# Patient Record
Sex: FEMALE | Race: WHITE | NOT HISPANIC OR LATINO | Employment: UNEMPLOYED | URBAN - METROPOLITAN AREA
[De-identification: names, ages, dates, MRNs, and addresses within clinical notes are randomized per-mention and may not be internally consistent; named-entity substitution may affect disease eponyms.]

---

## 2024-09-03 ENCOUNTER — OFFICE VISIT (OUTPATIENT)
Dept: ENDOCRINOLOGY | Age: 44
End: 2024-09-03
Payer: OTHER GOVERNMENT

## 2024-09-03 VITALS
OXYGEN SATURATION: 98 % | TEMPERATURE: 94.5 F | HEART RATE: 68 BPM | WEIGHT: 137 LBS | SYSTOLIC BLOOD PRESSURE: 108 MMHG | BODY MASS INDEX: 19.18 KG/M2 | DIASTOLIC BLOOD PRESSURE: 72 MMHG | HEIGHT: 71 IN

## 2024-09-03 DIAGNOSIS — E04.2 MULTIPLE THYROID NODULES: Primary | ICD-10-CM

## 2024-09-03 DIAGNOSIS — E03.9 ACQUIRED HYPOTHYROIDISM: ICD-10-CM

## 2024-09-03 DIAGNOSIS — K21.9 GASTROESOPHAGEAL REFLUX DISEASE, UNSPECIFIED WHETHER ESOPHAGITIS PRESENT: ICD-10-CM

## 2024-09-03 RX ORDER — LEVOTHYROXINE SODIUM 25 UG/1
25 TABLET ORAL
Qty: 90 TABLET | Refills: 1 | Status: SHIPPED | OUTPATIENT
Start: 2024-09-03

## 2024-09-03 RX ORDER — LEVOTHYROXINE SODIUM 25 UG/1
25 TABLET ORAL
COMMUNITY
Start: 2024-07-15 | End: 2024-09-03 | Stop reason: SDUPTHER

## 2024-09-03 RX ORDER — AZELASTINE 1 MG/ML
SPRAY, METERED NASAL
COMMUNITY
Start: 2024-05-09

## 2024-09-03 RX ORDER — SUMATRIPTAN 50 MG/1
TABLET, FILM COATED ORAL
COMMUNITY
Start: 2024-06-03

## 2024-09-03 RX ORDER — FLUTICASONE PROPIONATE 50 MCG
SPRAY, SUSPENSION (ML) NASAL
COMMUNITY
Start: 2024-06-03

## 2024-09-03 RX ORDER — ONDANSETRON 8 MG/1
8 TABLET, FILM COATED ORAL EVERY 8 HOURS PRN
COMMUNITY

## 2024-09-03 RX ORDER — SODIUM CHLORIDE 0.65 %
AEROSOL, SPRAY (ML) NASAL
COMMUNITY
Start: 2024-06-03
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
## 2024-09-04 LAB
ALBUMIN SERPL-MCNC: 4.6 G/DL (ref 3.5–5.2)
ALBUMIN/GLOB SERPL: 2.2 G/DL
ALP SERPL-CCNC: 38 U/L (ref 39–117)
ALT SERPL-CCNC: 11 U/L (ref 1–33)
AST SERPL-CCNC: 13 U/L (ref 1–32)
BILIRUB SERPL-MCNC: 0.6 MG/DL (ref 0–1.2)
BUN SERPL-MCNC: 7 MG/DL (ref 6–20)
BUN/CREAT SERPL: 10 (ref 7–25)
CALCIUM SERPL-MCNC: 9.2 MG/DL (ref 8.6–10.5)
CHLORIDE SERPL-SCNC: 106 MMOL/L (ref 98–107)
CO2 SERPL-SCNC: 25.9 MMOL/L (ref 22–29)
CREAT SERPL-MCNC: 0.7 MG/DL (ref 0.57–1)
EGFRCR SERPLBLD CKD-EPI 2021: 110.9 ML/MIN/1.73
GLOBULIN SER CALC-MCNC: 2.1 GM/DL
GLUCOSE SERPL-MCNC: 87 MG/DL (ref 65–99)
POTASSIUM SERPL-SCNC: 4.4 MMOL/L (ref 3.5–5.2)
PROT SERPL-MCNC: 6.7 G/DL (ref 6–8.5)
SODIUM SERPL-SCNC: 143 MMOL/L (ref 136–145)
T4 FREE SERPL-MCNC: 1.1 NG/DL (ref 0.92–1.68)
TSH SERPL DL<=0.005 MIU/L-ACNC: 1.06 UIU/ML (ref 0.27–4.2)

## 2024-09-04 NOTE — PROGRESS NOTES
Normal thyroid function test.  Continue levothyroxine 25 mcg/day.  Send copy of labs to Dr. Mac and Alyssa Soler NP.  Send copy of labs to patient.

## 2024-09-11 ENCOUNTER — TELEPHONE (OUTPATIENT)
Dept: ENDOCRINOLOGY | Age: 44
End: 2024-09-11
Payer: OTHER GOVERNMENT

## 2024-09-11 NOTE — TELEPHONE ENCOUNTER
Pt had called in and stated that she wanted to know if her EGFR from her result labs were in normal range. I advised pt that yes it was in normal range. Pt voiced understanding and had no further questions.

## 2024-10-08 ENCOUNTER — APPOINTMENT (OUTPATIENT)
Dept: CT IMAGING | Facility: HOSPITAL | Age: 44
End: 2024-10-08
Payer: OTHER GOVERNMENT

## 2024-10-08 ENCOUNTER — HOSPITAL ENCOUNTER (EMERGENCY)
Facility: HOSPITAL | Age: 44
Discharge: HOME | End: 2024-10-08
Attending: EMERGENCY MEDICINE
Payer: OTHER GOVERNMENT

## 2024-10-08 VITALS
HEART RATE: 80 BPM | TEMPERATURE: 98.3 F | OXYGEN SATURATION: 98 % | RESPIRATION RATE: 18 BRPM | SYSTOLIC BLOOD PRESSURE: 127 MMHG | DIASTOLIC BLOOD PRESSURE: 89 MMHG

## 2024-10-08 DIAGNOSIS — R10.84 GENERALIZED ABDOMINAL PAIN: Primary | ICD-10-CM

## 2024-10-08 LAB
ALBUMIN SERPL-MCNC: 4.9 G/DL (ref 3.5–5.2)
ALBUMIN/GLOB SERPL: 2.3 G/DL
ALP SERPL-CCNC: 50 U/L (ref 39–117)
ALT SERPL W P-5'-P-CCNC: 8 U/L (ref 1–33)
ANION GAP SERPL CALCULATED.3IONS-SCNC: 13.3 MMOL/L (ref 5–15)
AST SERPL-CCNC: 15 U/L (ref 1–32)
BACTERIA UR QL AUTO: ABNORMAL /HPF
BASOPHILS # BLD MANUAL: 0.03 10*3/MM3 (ref 0–0.2)
BASOPHILS NFR BLD MANUAL: 1 % (ref 0–1.5)
BILIRUB SERPL-MCNC: 1.3 MG/DL (ref 0–1.2)
BILIRUB UR QL STRIP: NEGATIVE
BUN SERPL-MCNC: 8 MG/DL (ref 6–20)
BUN/CREAT SERPL: 11.1 (ref 7–25)
CALCIUM SPEC-SCNC: 9.5 MG/DL (ref 8.6–10.5)
CHLORIDE SERPL-SCNC: 101 MMOL/L (ref 98–107)
CLARITY UR: CLEAR
CO2 SERPL-SCNC: 26.7 MMOL/L (ref 22–29)
COLOR UR: YELLOW
CREAT SERPL-MCNC: 0.72 MG/DL (ref 0.57–1)
DEPRECATED RDW RBC AUTO: 45.1 FL (ref 37–54)
EGFRCR SERPLBLD CKD-EPI 2021: 107.2 ML/MIN/1.73
EOSINOPHIL # BLD MANUAL: 0.1 10*3/MM3 (ref 0–0.4)
EOSINOPHIL NFR BLD MANUAL: 3 % (ref 0.3–6.2)
ERYTHROCYTE [DISTWIDTH] IN BLOOD BY AUTOMATED COUNT: 12.3 % (ref 12.3–15.4)
GLOBULIN UR ELPH-MCNC: 2.1 GM/DL
GLUCOSE SERPL-MCNC: 92 MG/DL (ref 65–99)
GLUCOSE UR STRIP-MCNC: NEGATIVE MG/DL
HCG SERPL QL: NEGATIVE
HCT VFR BLD AUTO: 46 % (ref 34–46.6)
HGB BLD-MCNC: 14.7 G/DL (ref 12–15.9)
HGB UR QL STRIP.AUTO: ABNORMAL
HYALINE CASTS UR QL AUTO: ABNORMAL /LPF
KETONES UR QL STRIP: NEGATIVE
LEUKOCYTE ESTERASE UR QL STRIP.AUTO: NEGATIVE
LIPASE SERPL-CCNC: 38 U/L (ref 13–60)
LYMPHOCYTES # BLD MANUAL: 1.16 10*3/MM3 (ref 0.7–3.1)
LYMPHOCYTES NFR BLD MANUAL: 2 % (ref 5–12)
MCH RBC QN AUTO: 31.7 PG (ref 26.6–33)
MCHC RBC AUTO-ENTMCNC: 32 G/DL (ref 31.5–35.7)
MCV RBC AUTO: 99.4 FL (ref 79–97)
MONOCYTES # BLD: 0.07 10*3/MM3 (ref 0.1–0.9)
NEUTROPHILS # BLD AUTO: 2.05 10*3/MM3 (ref 1.7–7)
NEUTROPHILS NFR BLD MANUAL: 60 % (ref 42.7–76)
NITRITE UR QL STRIP: NEGATIVE
NRBC BLD AUTO-RTO: 0 /100 WBC (ref 0–0.2)
PH UR STRIP.AUTO: 6.5 [PH] (ref 5–8)
PLAT MORPH BLD: NORMAL
PLATELET # BLD AUTO: 244 10*3/MM3 (ref 140–450)
PMV BLD AUTO: 10.1 FL (ref 6–12)
POTASSIUM SERPL-SCNC: 3.6 MMOL/L (ref 3.5–5.2)
PROT SERPL-MCNC: 7 G/DL (ref 6–8.5)
PROT UR QL STRIP: NEGATIVE
RBC # BLD AUTO: 4.63 10*6/MM3 (ref 3.77–5.28)
RBC # UR STRIP: ABNORMAL /HPF
RBC MORPH BLD: NORMAL
REF LAB TEST METHOD: ABNORMAL
SODIUM SERPL-SCNC: 141 MMOL/L (ref 136–145)
SP GR UR STRIP: <=1.005 (ref 1–1.03)
SQUAMOUS #/AREA URNS HPF: ABNORMAL /HPF
UROBILINOGEN UR QL STRIP: ABNORMAL
VARIANT LYMPHS NFR BLD MANUAL: 34 % (ref 19.6–45.3)
WBC # UR STRIP: ABNORMAL /HPF
WBC MORPH BLD: NORMAL
WBC NRBC COR # BLD AUTO: 3.41 10*3/MM3 (ref 3.4–10.8)

## 2024-10-08 PROCEDURE — 74177 CT ABD & PELVIS W/CONTRAST: CPT

## 2024-10-08 PROCEDURE — 84703 CHORIONIC GONADOTROPIN ASSAY: CPT | Performed by: EMERGENCY MEDICINE

## 2024-10-08 PROCEDURE — 85007 BL SMEAR W/DIFF WBC COUNT: CPT | Performed by: EMERGENCY MEDICINE

## 2024-10-08 PROCEDURE — 83690 ASSAY OF LIPASE: CPT | Performed by: EMERGENCY MEDICINE

## 2024-10-08 PROCEDURE — 85025 COMPLETE CBC W/AUTO DIFF WBC: CPT | Performed by: EMERGENCY MEDICINE

## 2024-10-08 PROCEDURE — 25510000001 IOPAMIDOL 61 % SOLUTION: Performed by: EMERGENCY MEDICINE

## 2024-10-08 PROCEDURE — 81001 URINALYSIS AUTO W/SCOPE: CPT | Performed by: EMERGENCY MEDICINE

## 2024-10-08 PROCEDURE — 99285 EMERGENCY DEPT VISIT HI MDM: CPT

## 2024-10-08 PROCEDURE — 80053 COMPREHEN METABOLIC PANEL: CPT | Performed by: EMERGENCY MEDICINE

## 2024-10-08 RX ORDER — IOPAMIDOL 612 MG/ML
100 INJECTION, SOLUTION INTRAVASCULAR
Status: COMPLETED | OUTPATIENT
Start: 2024-10-08 | End: 2024-10-08

## 2024-10-08 RX ORDER — SODIUM CHLORIDE 0.9 % (FLUSH) 0.9 %
10 SYRINGE (ML) INJECTION AS NEEDED
Status: DISCONTINUED | OUTPATIENT
Start: 2024-10-08 | End: 2024-10-08 | Stop reason: HOSPADM

## 2024-10-08 RX ADMIN — IOPAMIDOL 85 ML: 612 INJECTION, SOLUTION INTRAVENOUS at 12:48

## 2024-10-08 NOTE — ED NOTES
"Patient c/o abd pain since May. [facility] but reports \"[facility] aren't [facility] anything\".   "

## 2024-10-08 NOTE — ED PROVIDER NOTES
EMERGENCY DEPARTMENT ENCOUNTER    Room Number:    PCP: Provider, No Known    HPI:  Chief Complaint: Abdominal pain  A complete HPI/ROS/PMH/PSH/SH/FH are unobtainable due to: None  Context: Shantel Welsh is a 44 y.o. female who presents to the ED c/o acute abdominal pain.  Patient presents with right-sided abdominal pain and left lower quadrant abdominal pain which she has had since May.  Pain is constant.  She reports that she is here because she has had less of an appetite recently.  She reports having longstanding history of IBS.  She has been seen by GI and had a colonoscopy done that showed diverticulosis which she thinks is the cause of her left lower quadrant pain but still feels like she does not understand why she has right-sided abdominal pain.  No fevers or vomiting.  No diarrhea.  She does report having constipation.      PAST MEDICAL HISTORY  Active Ambulatory Problems     Diagnosis Date Noted    No Active Ambulatory Problems     Resolved Ambulatory Problems     Diagnosis Date Noted    No Resolved Ambulatory Problems     Past Medical History:   Diagnosis Date    ADD (attention deficit disorder)     Migraine headache     Raynaud phenomenon          PAST SURGICAL HISTORY  Past Surgical History:   Procedure Laterality Date    COLONOSCOPY  2024    ENDOSCOPY  2024    HEMORRHOIDECTOMY           FAMILY HISTORY  Family History   Problem Relation Age of Onset    Hypertension Father     Hypothyroidism Maternal Grandmother     Pancreatic cancer Maternal Grandfather          SOCIAL HISTORY  Social History     Socioeconomic History    Marital status: Single   Tobacco Use    Smoking status: Former     Current packs/day: 0.00     Average packs/day: 0.5 packs/day for 6.3 years (3.2 ttl pk-yrs)     Types: Cigarettes     Start date: 9/3/2009     Quit date: 2016     Years since quittin.7    Smokeless tobacco: Never   Substance and Sexual Activity    Alcohol use: Not Currently         ALLERGIES  Patient has  no known allergies.        REVIEW OF SYSTEMS  Review of Systems     Included in HPI  All systems reviewed and negative except for those discussed in HPI.       PHYSICAL EXAM  ED Triage Vitals [10/08/24 1051]   Temp Heart Rate Resp BP SpO2   98.3 °F (36.8 °C) 110 18 -- 100 %      Temp src Heart Rate Source Patient Position BP Location FiO2 (%)   Tympanic Monitor -- -- --       Physical Exam      GENERAL: no acute distress  HENT: nares patent  EYES: no scleral icterus  CV: regular rhythm, normal rate  RESPIRATORY: normal effort clear to auscultation bilaterally  ABDOMEN: soft, generalized abdominal tenderness without any guarding  MUSCULOSKELETAL: no deformity  NEURO: alert, moves all extremities, follows commands  PSYCH: Anxious affect  SKIN: warm, dry    Vital signs and nursing notes reviewed.          LAB RESULTS  No results found for this or any previous visit (from the past 24 hour(s)).    Ordered the above labs and reviewed the results.        RADIOLOGY  No Radiology Exams Resulted Within Past 24 Hours    Ordered the above noted radiological studies. Reviewed by me in PACS.        MEDICATIONS GIVEN IN ER  Medications   sodium chloride 0.9 % flush 10 mL (has no administration in time range)         ORDERS PLACED DURING THIS VISIT:  Orders Placed This Encounter   Procedures    CT Abdomen Pelvis With Contrast    Comprehensive Metabolic Panel    Lipase    Urinalysis With Microscopic If Indicated (No Culture) - Urine, Clean Catch    hCG, Serum, Qualitative    CBC Auto Differential    Insert Peripheral IV    CBC & Differential         OUTPATIENT MEDICATION MANAGEMENT:  Current Facility-Administered Medications Ordered in Epic   Medication Dose Route Frequency Provider Last Rate Last Admin    sodium chloride 0.9 % flush 10 mL  10 mL Intravenous PRN Mike Jim II, MD         Current Outpatient Medications Ordered in Epic   Medication Sig Dispense Refill    azelastine (ASTELIN) 0.1 % nasal spray       Deep Sea  Nasal Spray 0.65 % nasal spray       fluticasone (FLONASE) 50 MCG/ACT nasal spray       levothyroxine (SYNTHROID, LEVOTHROID) 25 MCG tablet Take 1 tablet by mouth Every Morning. 90 tablet 1    ondansetron (ZOFRAN) 8 MG tablet Take 1 tablet by mouth Every 8 (Eight) Hours As Needed for Nausea or Vomiting.      Probiotic Product (PROBIOTIC DAILY PO) Take  by mouth.      SUMAtriptan (IMITREX) 50 MG tablet          PROCEDURES  Procedures          MEDICAL DECISION MAKING, PROGRESS, and CONSULTS    Discussion below represents my analysis of pertinent findings related to patient's condition, differential diagnosis, treatment plan and final disposition.                Differential diagnosis:    Differential diagnosis includes but is not limited to:  - hepatobiliary pathology such as cholecystitis, cholangitis, and symptomatic cholelithiasis  - Pancreatitis  - Dyspepsia  - Small bowel obstruction  - Appendicitis  - Diverticulitis  - UTI including pyelonephritis  - Ureteral stone  - Zoster  - Colitis, including infectious and ischemic                   Independent interpretation of labs, radiology studies, and discussions with consultants:  ED Course as of 10/09/24 1330   Tue Oct 08, 2024   1156 Leukocytes, UA: Negative [TD]   1156 Nitrite, UA: Negative [TD]   1156 WBC, UA: 0-2 [TD]   1156 Bacteria, UA: None Seen [TD]   1240 Lipase: 38 [TD]   1303 CT of the abdomen pelvis independently interpreted by myself.  I see no ureteral stone.  No significant stool burden. [TD]      ED Course User Index  [TD] Mike Jim II, MD         I believe patient is stable for d/c home. She would probably benefit from eval with GI given her symptoms. I see no acute issues that warrant admission at this time.         DIAGNOSIS  Final diagnoses:   Generalized abdominal pain         DISPOSITION  DISCHARGE    FOLLOW-UP  Ortiz Serna MD  [address]  [facility] [address] [phone]    Schedule an appointment as  soon as possible for a visit   As needed         Medication List      No changes were made to your prescriptions during this visit.             Latest Documented Vital Signs:  As of 11:14 EDT  BP-   HR- 110 Temp- 98.3 °F (36.8 °C) (Tympanic) O2 sat- 100%      --    Please note that portions of this were completed with a voice recognition program.       Note Disclaimer: At [facility], we believe that sharing information builds trust and better relationships. You are receiving this note because you are receiving care at [facility] or recently visited. It is possible you will see health information before a provider has talked with you about it. This kind of information can be easy to misunderstand. To help you fully understand what it means for your health, we urge you to discuss this note with your provider.         Mike Jim II, MD  10/09/24 5803

## 2024-10-24 ENCOUNTER — PATIENT MESSAGE (OUTPATIENT)
Dept: ENDOCRINOLOGY | Age: 44
End: 2024-10-24
Payer: OTHER GOVERNMENT

## 2024-10-30 ENCOUNTER — PATIENT ROUNDING (BHMG ONLY) (OUTPATIENT)
Dept: GASTROENTEROLOGY | Facility: CLINIC | Age: 44
End: 2024-10-30
Payer: OTHER GOVERNMENT

## 2024-10-30 ENCOUNTER — TELEPHONE (OUTPATIENT)
Dept: GASTROENTEROLOGY | Facility: CLINIC | Age: 44
End: 2024-10-30
Payer: OTHER GOVERNMENT

## 2024-10-30 ENCOUNTER — OFFICE VISIT (OUTPATIENT)
Dept: GASTROENTEROLOGY | Facility: CLINIC | Age: 44
End: 2024-10-30
Payer: COMMERCIAL

## 2024-10-30 VITALS
DIASTOLIC BLOOD PRESSURE: 76 MMHG | SYSTOLIC BLOOD PRESSURE: 121 MMHG | HEART RATE: 73 BPM | TEMPERATURE: 97.7 F | HEIGHT: 71 IN | WEIGHT: 132.8 LBS | BODY MASS INDEX: 18.59 KG/M2

## 2024-10-30 DIAGNOSIS — R10.31 RLQ ABDOMINAL PAIN: ICD-10-CM

## 2024-10-30 DIAGNOSIS — R63.4 WEIGHT LOSS, ABNORMAL: Primary | ICD-10-CM

## 2024-10-30 DIAGNOSIS — R10.84 GENERALIZED POSTPRANDIAL ABDOMINAL PAIN: ICD-10-CM

## 2024-10-30 DIAGNOSIS — K58.1 IRRITABLE BOWEL SYNDROME WITH CONSTIPATION: ICD-10-CM

## 2024-10-30 RX ORDER — ACETAMINOPHEN AND CODEINE PHOSPHATE 120; 12 MG/5ML; MG/5ML
0.35 SOLUTION ORAL
COMMUNITY
Start: 2024-09-19

## 2024-10-30 RX ORDER — TURMERIC 400 MG
CAPSULE ORAL
COMMUNITY

## 2024-10-30 RX ORDER — DICYCLOMINE HYDROCHLORIDE 10 MG/1
10 CAPSULE ORAL
COMMUNITY

## 2024-10-30 NOTE — PROGRESS NOTES
"Chief Complaint  Abdominal Pain, Constipation, and Heartburn    Subjective          History Of Present Illness:    Shantel Welsh is a  44 y.o. female patient with a PMH of Hashimoto's thyroiditis, depression, ADHD, migraines who presents as a new patient for evaluation of abdominal pain, constipation, nausea.    Patient reports since May she has lost 20 lbs due to pain with eating in RLQ. Patient does report nausea. No vomiting. She has been taking a stool softener since her CT scan (showing increased stool burden) in early October and feels like this is helping. Patient reports fatigue after eating.  She has been using dicyclomine as needed which is helpful.  Patient does report some bleeding and attributes this to hemorrhoids.  She was prescribed Elavil by Dr. Rodas but did not start it due to lack of education by her provider.     CT abdomen/pelvis reviewed from 10/8/2024 with focal fatty infiltration of the liver, unremarkable appearing gallbladder with no biliary dilation, normal spleen, pancreas, kidneys, bowel.  There is an average volume of formed stool within the colon.  No evidence of colitis or diverticulitis.    Patient did recently undergo endoscopic evaluation with Dr. Rodas. She had an EGD and colonoscopy. The EGD with findings of gastritis.  Biopsies without any obvious H. pylori or celiac disease. Patient does feel that she did not get the appropriate care there and is inquiring on having another EGD completed.  Colonoscopy on 8/28/2024 showed diverticulosis of the left side of the colon, normal mucosa, grade 1 internal hemorrhoids.  She did undergo repeat flexible sigmoidoscopy in September and had a hemorrhoid banding's procedure done.    Objective   Vital Signs:   /76   Pulse 73   Temp 97.7 °F (36.5 °C)   Ht 180.3 cm (71\")   Wt 60.2 kg (132 lb 12.8 oz)   BMI 18.52 kg/m²       Physical Exam  Vitals reviewed.   Constitutional:       General: She is not in acute distress.     Appearance: " Normal appearance. She is not ill-appearing.   HENT:      Head: Normocephalic and atraumatic.      Nose: Nose normal.      Mouth/Throat:      Pharynx: Oropharynx is clear.   Eyes:      Conjunctiva/sclera: Conjunctivae normal.   Pulmonary:      Effort: Pulmonary effort is normal.   Abdominal:      General: There is no distension.      Palpations: Abdomen is soft. There is no mass.      Tenderness: There is abdominal tenderness in the right lower quadrant and epigastric area.   Musculoskeletal:         General: No swelling. Normal range of motion.      Cervical back: Normal range of motion.   Skin:     General: Skin is warm and dry.      Findings: No bruising or rash.   Neurological:      General: No focal deficit present.      Mental Status: She is alert and oriented to person, place, and time.      Motor: No weakness.      Gait: Gait normal.   Psychiatric:         Mood and Affect: Mood normal.          Result Review :   The following data was reviewed by: Myla Retana PA-C on 10/30/2024:  CMP          9/3/2024    14:44 10/8/2024    11:30   CMP   Glucose 87  92    BUN 7  8    Creatinine 0.70  0.72    EGFR  107.2    Sodium 143  141    Potassium 4.4  3.6    Chloride 106  101    Calcium 9.2  9.5    Total Protein 6.7     Total Protein  7.0    Albumin 4.6  4.9    Globulin 2.1     Globulin  2.1    Total Bilirubin 0.6  1.3    Alkaline Phosphatase 38  50    AST (SGOT) 13  15    ALT (SGPT) 11  8    Albumin/Globulin Ratio  2.3    BUN/Creatinine Ratio 10.0  11.1    Anion Gap  13.3      CBC          10/8/2024    11:30   CBC   WBC 3.41    RBC 4.63    Hemoglobin 14.7    Hematocrit 46.0    MCV 99.4    MCH 31.7    MCHC 32.0    RDW 12.3    Platelets 244            Assessment and Plan    Diagnoses and all orders for this visit:    1. Weight loss, abnormal (Primary)  -     Case Request; Standing  -     Implement Anesthesia orders day of procedure.; Standing  -     Case Request  -     Celiac Comprehensive Panel  -     NM HIDA  Scan With Pharmacological Intervention; Future  -     US Gallbladder    2. RLQ abdominal pain  -     Case Request; Standing  -     Implement Anesthesia orders day of procedure.; Standing  -     Case Request  -     Celiac Comprehensive Panel  -     NM HIDA Scan With Pharmacological Intervention; Future    3. Irritable bowel syndrome with constipation  -     Case Request; Standing  -     Implement Anesthesia orders day of procedure.; Standing  -     Case Request  -     Celiac Comprehensive Panel  -     NM HIDA Scan With Pharmacological Intervention; Future    4. Generalized postprandial abdominal pain  -     Celiac Comprehensive Panel  -     NM HIDA Scan With Pharmacological Intervention; Future  -     US Gallbladder       Will proceed with repeat EGD for further evaluation of the above symptoms.  She is currently using omeprazole 20 mg as needed and may need to take this on a regular basis.  Do not take at the time of her thyroid medication.  Recommend proceeding with further gallbladder workup with gallbladder ultrasound and HIDA scan.  I do think that her symptoms are from IBS based on her prior colonoscopies and symptomology. Continue dicyclomine as needed  Continue daily stool softener for constipation  Check celiac panel  We did discuss that if she undergoes thyroidectomy she may see some improvement in her GI symptoms but we cannot be certain.  Would like to continue to rule out other etiologies.    Follow Up   Return for EGD.    Dragon dictation used throughout this note.            Myla Goldman PA-C   [facility]  [address] [place] [address]  Office: [phone]

## 2024-10-31 LAB
ENDOMYSIUM IGA SER QL: NEGATIVE
GLIADIN PEPTIDE IGA SER-ACNC: 3 UNITS (ref 0–19)
GLIADIN PEPTIDE IGG SER-ACNC: 2 UNITS (ref 0–19)
IGA SERPL-MCNC: 151 MG/DL (ref 87–352)
TTG IGA SER-ACNC: <2 U/ML (ref 0–3)
TTG IGG SER-ACNC: <2 U/ML (ref 0–5)

## 2024-11-01 NOTE — PROGRESS NOTES
October 30, 2024    Hello, may I speak with Shantel Welsh?    My name is Kathy      I am  with [facility]  [address] [facility] [address] [place] [id number] [phone].    Before we get started may I verify your date of birth? 1980    I am calling to officially welcome you to our practice and ask about your recent visit. Is this a good time to talk? yes    Tell me about your visit with us. What things went well?  I could not have been happier with the visit. I have been having issues for a while [facility]. I felt so heard by your office after about 6 months of not being heard. Within an hour I was seen, heard and gallbladder test was ordered which [facility] would not [facility]. Your office also ordered a Celiac test for me which I have been begging the [facility] to do and they would not. I really felt heard at your office. Everything was done within an hour and you all were speedy. I was really satisfied with the visit.        We're always looking for ways to make our patients' experiences even better. Do you have recommendations on ways we may improve?  no    Overall were you satisfied with your first visit to our practice? yes       I appreciate you taking the time to speak with me today. Is there anything else I can do for you? no      Thank you, and have a great day.

## 2024-11-13 ENCOUNTER — HOSPITAL ENCOUNTER (OUTPATIENT)
Dept: NUCLEAR MEDICINE | Facility: HOSPITAL | Age: 44
Discharge: HOME | End: 2024-11-13
Payer: OTHER GOVERNMENT

## 2024-11-13 ENCOUNTER — HOSPITAL ENCOUNTER (OUTPATIENT)
Dept: ULTRASOUND IMAGING | Facility: HOSPITAL | Age: 44
Discharge: HOME | End: 2024-11-13
Admitting: PHYSICIAN ASSISTANT
Payer: COMMERCIAL

## 2024-11-13 DIAGNOSIS — K58.1 IRRITABLE BOWEL SYNDROME WITH CONSTIPATION: ICD-10-CM

## 2024-11-13 DIAGNOSIS — R63.4 WEIGHT LOSS, ABNORMAL: ICD-10-CM

## 2024-11-13 DIAGNOSIS — R10.31 RLQ ABDOMINAL PAIN: ICD-10-CM

## 2024-11-13 DIAGNOSIS — R10.84 GENERALIZED POSTPRANDIAL ABDOMINAL PAIN: ICD-10-CM

## 2024-11-13 PROCEDURE — 78227 HEPATOBIL SYST IMAGE W/DRUG: CPT

## 2024-11-13 PROCEDURE — A9537 TC99M MEBROFENIN: HCPCS | Performed by: PHYSICIAN ASSISTANT

## 2024-11-13 PROCEDURE — 34310000005 TECHNETIUM TC 99M MEBROFENIN KIT: Performed by: PHYSICIAN ASSISTANT

## 2024-11-13 PROCEDURE — 76705 ECHO EXAM OF ABDOMEN: CPT

## 2024-11-13 PROCEDURE — 25010000002 SINCALIDE PER 5 MCG: Performed by: PHYSICIAN ASSISTANT

## 2024-11-13 RX ORDER — KIT FOR THE PREPARATION OF TECHNETIUM TC 99M MEBROFENIN 45 MG/10ML
1 INJECTION, POWDER, LYOPHILIZED, FOR SOLUTION INTRAVENOUS
Status: COMPLETED | OUTPATIENT
Start: 2024-11-13 | End: 2024-11-13

## 2024-11-13 RX ADMIN — MEBROFENIN 1 DOSE: 45 INJECTION, POWDER, LYOPHILIZED, FOR SOLUTION INTRAVENOUS at 07:05

## 2024-11-13 RX ADMIN — SINCALIDE 1.2 MCG: 5 INJECTION, POWDER, LYOPHILIZED, FOR SOLUTION INTRAVENOUS at 07:57

## 2024-11-18 ENCOUNTER — OFFICE VISIT (OUTPATIENT)
Dept: FAMILY MEDICINE CLINIC | Facility: CLINIC | Age: 44
End: 2024-11-18
Payer: COMMERCIAL

## 2024-11-18 VITALS
OXYGEN SATURATION: 97 % | WEIGHT: 139.2 LBS | RESPIRATION RATE: 16 BRPM | BODY MASS INDEX: 19.49 KG/M2 | HEART RATE: 67 BPM | SYSTOLIC BLOOD PRESSURE: 102 MMHG | DIASTOLIC BLOOD PRESSURE: 70 MMHG | HEIGHT: 71 IN | TEMPERATURE: 97.8 F

## 2024-11-18 DIAGNOSIS — E04.2 MULTIPLE THYROID NODULES: ICD-10-CM

## 2024-11-18 DIAGNOSIS — Z76.89 ENCOUNTER TO ESTABLISH CARE: Primary | ICD-10-CM

## 2024-11-18 DIAGNOSIS — G43.109 MIGRAINE WITH AURA AND WITHOUT STATUS MIGRAINOSUS, NOT INTRACTABLE: ICD-10-CM

## 2024-11-18 DIAGNOSIS — E06.3 HYPOTHYROIDISM DUE TO HASHIMOTO THYROIDITIS: ICD-10-CM

## 2024-11-18 DIAGNOSIS — D64.9 ANEMIA, UNSPECIFIED TYPE: ICD-10-CM

## 2024-11-18 DIAGNOSIS — K76.0 HEPATIC STEATOSIS: ICD-10-CM

## 2024-11-18 PROCEDURE — 99204 OFFICE O/P NEW MOD 45 MIN: CPT

## 2024-11-18 PROCEDURE — 1159F MED LIST DOCD IN RCRD: CPT

## 2024-11-18 PROCEDURE — 1160F RVW MEDS BY RX/DR IN RCRD: CPT

## 2024-11-18 NOTE — PROGRESS NOTES
Chief Complaint  Establish Care and Thyroid Problem (Thyroid Tumor )    Subjective        Shantel Welsh presents to [facility]    History of Present Illness  44 year old female presents to establish care. Pt is new to me and this clinic. She is followed by [facility], Dr. Conroy, for multiple thyroid nodules. She is currently on levothyroxine 25mcg daily. Has plans for fine-needle aspiration of thyroid nodules with Dr. Mac. Reports she has had performed at [facility]. F/U with Dr. Conroy scheduled for 1/22/25. Reports she has option of thyroidectomy but she is unsure if she wants to follow through. Wants her symptoms alleviated and wants second opinion with Dr. Hollis. Referral placed. She believes nodule is growing along her vocal cords. Was in school for music therapy at [employer]. Has been told she has folate and iron deficiency. Encouraged daily multivitamin with iron. She is taking iron supplement every other day and folate daily. US performed 11/13/24 shows borderline hepatic steatosis. She is corn, dairy, and gluten free. Does not eat raw vegetables. She has abdominal pain with sugar intake. She is followed by [facility] for abnormal weight loss, abdominal pain, constipation, and heart burn. She has plans for EGD on 2/14/25. Reports she had MRI of brain that showed mild small vessel disease. Has noticed increase in migraines. Was performed at [facility]. She is seeing Neurology on 12/2. Would like to establish with  Neuro.  Is experiencing about 10 migraines/month with aura and taking sumatriptan as needed. She said she had abnormal EKG at [facility]. She is having Echocardiogram on 12/11. Reports she had mammogram about 2 months ago performed at [facility]. States her pap smear was performed in January of this year.        Objective   Vital Signs:  /70 (BP Location: Left arm, Patient Position: Sitting, Cuff Size: Adult)   Pulse 67   Temp 97.8 °F (36.6  "°C) (Infrared)   Resp 16   Ht 180.3 cm (71\")   Wt 63.1 kg (139 lb 3.2 oz)   SpO2 97%   BMI 19.41 kg/m²   Estimated body mass index is 19.41 kg/m² as calculated from the following:    Height as of this encounter: 180.3 cm (71\").    Weight as of this encounter: 63.1 kg (139 lb 3.2 oz).    BMI is within normal parameters. No other follow-up for BMI required.      Physical Exam  Constitutional:       Appearance: Normal appearance.   HENT:      Head: Normocephalic.   Eyes:      Conjunctiva/sclera: Conjunctivae normal.      Pupils: Pupils are equal, round, and reactive to light.   Cardiovascular:      Rate and Rhythm: Normal rate and regular rhythm.      Pulses: Normal pulses.      Heart sounds: Normal heart sounds.   Pulmonary:      Effort: Pulmonary effort is normal.      Breath sounds: Normal breath sounds. No wheezing.   Skin:     General: Skin is warm.   Neurological:      General: No focal deficit present.      Mental Status: She is alert and oriented to person, place, and time.   Psychiatric:         Mood and Affect: Mood normal.         Behavior: Behavior normal.            Result Review :  The following data was reviewed by: JOEY Braun on 11/18/2024:  Common labs          9/3/2024    14:44 10/8/2024    11:30   Common Labs   Glucose 87  92    BUN 7  8    Creatinine 0.70  0.72    Sodium 143  141    Potassium 4.4  3.6    Chloride 106  101    Calcium 9.2  9.5    Total Protein 6.7     Albumin 4.6  4.9    Total Bilirubin 0.6  1.3    Alkaline Phosphatase 38  50    AST (SGOT) 13  15    ALT (SGPT) 11  8    WBC  3.41    Hemoglobin  14.7    Hematocrit  46.0    Platelets  244      Data reviewed : labs            Assessment and Plan   Diagnoses and all orders for this visit:    1. Encounter to establish care (Primary)    2. Hypothyroidism due to Hashimoto thyroiditis  -     Ambulatory Referral to General Surgery    3. Multiple thyroid nodules  -     Ambulatory Referral to General Surgery    4. Anemia, " unspecified type  -     CBC w AUTO Differential  -     Vitamin B12 & Folate  -     Iron and TIBC  -     Ferritin    5. Hepatic steatosis  -     Comprehensive metabolic panel  -     GOULD Fibrosure    6. Migraine with aura and without status migrainosus, not intractable  -     Ambulatory Referral to Neurology             Follow Up   Return in about 4 weeks (around 12/16/2024) for Annual physical.  Patient was given instructions and counseling regarding her condition or for health maintenance advice. Please see specific information pulled into the AVS if appropriate.

## 2024-11-22 LAB
A2 MACROGLOB SERPL-MCNC: 156 MG/DL (ref 110–276)
ALBUMIN SERPL-MCNC: 4.3 G/DL (ref 3.9–4.9)
ALP SERPL-CCNC: 44 IU/L (ref 44–121)
ALT SERPL W P-5'-P-CCNC: 21 IU/L (ref 0–40)
ALT SERPL-CCNC: 15 IU/L (ref 0–32)
APO A-I SERPL-MCNC: 181 MG/DL (ref 116–209)
AST SERPL W P-5'-P-CCNC: 19 IU/L (ref 0–40)
AST SERPL-CCNC: 17 IU/L (ref 0–40)
BASOPHILS # BLD AUTO: 0 X10E3/UL (ref 0–0.2)
BASOPHILS NFR BLD AUTO: 1 %
BILIRUB SERPL-MCNC: 0.5 MG/DL (ref 0–1.2)
BILIRUB SERPL-MCNC: 1.1 MG/DL (ref 0–1.2)
BUN SERPL-MCNC: 11 MG/DL (ref 6–24)
BUN/CREAT SERPL: 14 (ref 9–23)
CALCIUM SERPL-MCNC: 9.4 MG/DL (ref 8.7–10.2)
CHLORIDE SERPL-SCNC: 104 MMOL/L (ref 96–106)
CHOLEST SERPL-MCNC: 195 MG/DL (ref 100–199)
CO2 SERPL-SCNC: 25 MMOL/L (ref 20–29)
CREAT SERPL-MCNC: 0.77 MG/DL (ref 0.57–1)
EGFRCR SERPLBLD CKD-EPI 2021: 99 ML/MIN/1.73
EOSINOPHIL # BLD AUTO: 0.2 X10E3/UL (ref 0–0.4)
EOSINOPHIL NFR BLD AUTO: 3 %
ERYTHROCYTE [DISTWIDTH] IN BLOOD BY AUTOMATED COUNT: 12.2 % (ref 11.7–15.4)
FERRITIN SERPL-MCNC: 42 NG/ML (ref 15–150)
FIBROSIS SCORING:: NORMAL
FIBROSIS STAGE SERPL QL: NORMAL
FOLATE SERPL-MCNC: 16.7 NG/ML
GGT SERPL-CCNC: 8 IU/L (ref 0–60)
GLOBULIN SER CALC-MCNC: 1.9 G/DL (ref 1.5–4.5)
GLUCOSE SERPL-MCNC: 88 MG/DL (ref 70–99)
GLUCOSE SERPL-MCNC: 89 MG/DL (ref 70–99)
HAPTOGLOB SERPL-MCNC: 97 MG/DL (ref 42–296)
HCT VFR BLD AUTO: 39.5 % (ref 34–46.6)
HGB BLD-MCNC: 12.5 G/DL (ref 11.1–15.9)
IMM GRANULOCYTES # BLD AUTO: 0 X10E3/UL (ref 0–0.1)
IMM GRANULOCYTES NFR BLD AUTO: 0 %
IRON SATN MFR SERPL: 54 % (ref 15–55)
IRON SERPL-MCNC: 155 UG/DL (ref 27–159)
LABORATORY COMMENT REPORT: NORMAL
LIVER FIBR SCORE SERPL CALC.FIBROSURE: 0.04 (ref 0–0.21)
LIVER STEATOSIS GRADE SERPL QL: NORMAL
LIVER STEATOSIS SCORE SERPL: 0.18 (ref 0–0.4)
LYMPHOCYTES # BLD AUTO: 1.7 X10E3/UL (ref 0.7–3.1)
LYMPHOCYTES NFR BLD AUTO: 31 %
MCH RBC QN AUTO: 31.4 PG (ref 26.6–33)
MCHC RBC AUTO-ENTMCNC: 31.6 G/DL (ref 31.5–35.7)
MCV RBC AUTO: 99 FL (ref 79–97)
MONOCYTES # BLD AUTO: 0.4 X10E3/UL (ref 0.1–0.9)
MONOCYTES NFR BLD AUTO: 7 %
NASH GRADE SERPL QL: NORMAL
NASH INTERPRETATION SERPL-IMP: NORMAL
NASH SCORE SERPL: 0 (ref 0–0.25)
NASH SCORING: NORMAL
NEUTROPHILS # BLD AUTO: 3.3 X10E3/UL (ref 1.4–7)
NEUTROPHILS NFR BLD AUTO: 58 %
PLATELET # BLD AUTO: 238 X10E3/UL (ref 150–450)
POTASSIUM SERPL-SCNC: 4.6 MMOL/L (ref 3.5–5.2)
PROT SERPL-MCNC: 6.2 G/DL (ref 6–8.5)
RBC # BLD AUTO: 3.98 X10E6/UL (ref 3.77–5.28)
SODIUM SERPL-SCNC: 140 MMOL/L (ref 134–144)
STEATOSIS SCORING: NORMAL
TEST PERFORMANCE INFO SPEC: NORMAL
TEST PERFORMANCE INFO SPEC: NORMAL
TIBC SERPL-MCNC: 287 UG/DL (ref 250–450)
TRIGL SERPL-MCNC: 94 MG/DL (ref 0–149)
UIBC SERPL-MCNC: 132 UG/DL (ref 131–425)
VIT B12 SERPL-MCNC: 417 PG/ML (ref 232–1245)
WBC # BLD AUTO: 5.7 X10E3/UL (ref 3.4–10.8)

## 2024-11-24 DIAGNOSIS — E04.2 MULTIPLE THYROID NODULES: ICD-10-CM

## 2024-11-24 DIAGNOSIS — E06.3 HYPOTHYROIDISM DUE TO HASHIMOTO THYROIDITIS: Primary | ICD-10-CM

## 2024-11-25 ENCOUNTER — PATIENT MESSAGE (OUTPATIENT)
Dept: FAMILY MEDICINE CLINIC | Facility: CLINIC | Age: 44
End: 2024-11-25
Payer: COMMERCIAL

## 2024-12-02 DIAGNOSIS — M24.9 HYPERMOBILITY OF JOINT: Primary | ICD-10-CM

## 2024-12-05 ENCOUNTER — HOSPITAL ENCOUNTER (OUTPATIENT)
Dept: ULTRASOUND IMAGING | Facility: HOSPITAL | Age: 44
Discharge: HOME | End: 2024-12-05
Payer: COMMERCIAL

## 2024-12-05 DIAGNOSIS — E04.2 MULTIPLE THYROID NODULES: ICD-10-CM

## 2024-12-05 DIAGNOSIS — E06.3 HYPOTHYROIDISM DUE TO HASHIMOTO THYROIDITIS: ICD-10-CM

## 2024-12-05 PROCEDURE — 76536 US EXAM OF HEAD AND NECK: CPT

## 2024-12-13 ENCOUNTER — TELEPHONE (OUTPATIENT)
Dept: SURGERY | Facility: CLINIC | Age: 44
End: 2024-12-13
Payer: COMMERCIAL

## 2024-12-16 ENCOUNTER — TELEPHONE (OUTPATIENT)
Dept: SURGERY | Facility: CLINIC | Age: 44
End: 2024-12-16
Payer: COMMERCIAL

## 2024-12-16 NOTE — TELEPHONE ENCOUNTER
2X attempt to contact patient to schedule with Dr. Rodriguez. First available okay      -DX: Hypothyroidism due to Hashimoto thyroiditis and   Multiple thyroid nodules     Anyone can schedule.

## 2024-12-17 ENCOUNTER — OFFICE VISIT (OUTPATIENT)
Dept: NEUROLOGY | Facility: CLINIC | Age: 44
End: 2024-12-17
Payer: COMMERCIAL

## 2024-12-17 VITALS
BODY MASS INDEX: 19.32 KG/M2 | OXYGEN SATURATION: 100 % | WEIGHT: 138 LBS | HEART RATE: 86 BPM | DIASTOLIC BLOOD PRESSURE: 64 MMHG | HEIGHT: 71 IN | SYSTOLIC BLOOD PRESSURE: 114 MMHG

## 2024-12-17 DIAGNOSIS — G43.709 CHRONIC MIGRAINE WITHOUT AURA WITHOUT STATUS MIGRAINOSUS, NOT INTRACTABLE: Primary | ICD-10-CM

## 2024-12-17 PROBLEM — J45.40 MODERATE PERSISTENT ASTHMA: Status: ACTIVE | Noted: 2024-12-17

## 2024-12-17 PROBLEM — R93.89 ABNORMAL IMAGING OF THYROID: Status: ACTIVE | Noted: 2020-11-09

## 2024-12-17 PROBLEM — F43.12 CHRONIC POST-TRAUMATIC STRESS DISORDER: Status: ACTIVE | Noted: 2024-12-17

## 2024-12-17 PROBLEM — E55.9 VITAMIN D DEFICIENCY: Status: ACTIVE | Noted: 2020-02-26

## 2024-12-17 PROBLEM — E04.1 THYROID NODULE: Status: ACTIVE | Noted: 2024-12-17

## 2024-12-17 PROBLEM — F90.2 ATTENTION DEFICIT HYPERACTIVITY DISORDER, COMBINED TYPE: Status: ACTIVE | Noted: 2024-12-17

## 2024-12-17 PROBLEM — G43.909 MIGRAINE: Status: ACTIVE | Noted: 2024-12-17

## 2024-12-17 PROBLEM — H52.209 ASTIGMATISM: Status: ACTIVE | Noted: 2024-12-17

## 2024-12-17 PROBLEM — J38.3 DISORDER OF VOCAL CORD: Status: ACTIVE | Noted: 2024-12-17

## 2024-12-17 PROBLEM — F32.9 MAJOR DEPRESSIVE DISORDER: Status: ACTIVE | Noted: 2024-12-17

## 2024-12-17 PROBLEM — R11.2 NAUSEA AND VOMITING: Status: ACTIVE | Noted: 2024-12-17

## 2024-12-17 PROBLEM — H47.329 DRUSEN OF OPTIC DISC: Status: ACTIVE | Noted: 2024-12-17

## 2024-12-17 PROBLEM — M26.609 TEMPOROMANDIBULAR JOINT DISORDER: Status: ACTIVE | Noted: 2024-12-17

## 2024-12-17 PROBLEM — E06.3 HASHIMOTO'S THYROIDITIS: Status: ACTIVE | Noted: 2024-09-13

## 2024-12-17 RX ORDER — AMITRIPTYLINE HYDROCHLORIDE 10 MG/1
10 TABLET ORAL NIGHTLY
Qty: 90 TABLET | Refills: 1 | Status: SHIPPED | OUTPATIENT
Start: 2024-12-17 | End: 2025-06-15

## 2024-12-17 RX ORDER — SUMATRIPTAN SUCCINATE 100 MG/1
100 TABLET ORAL AS NEEDED
Qty: 12 TABLET | Refills: 5 | Status: SHIPPED | OUTPATIENT
Start: 2024-12-17 | End: 2025-06-15

## 2024-12-17 NOTE — PROGRESS NOTES
[facility] NEUROLOGY         Date of Visit: 2024    Name: Shantel Welsh    :  1980    PCP: Katya Nieto APRN    Visit Type: an initial evaluation         Subjective     Patient ID: Shantel is a 44 y.o. female.         History of Present Illness  I have had the pleasure of seeing your patient for the first time today.  As you may know she is a 44-year-old female here today for initial evaluation for concerns of chronic daily and migraine headache symptoms.  She was referred by the [facility].    History:    Patient does have history of migraine headache, PTSD, ADHD, TMJ, Hashimoto's thyroiditis, IBS, and asthma.    Patient states that she has been experiencing symptoms of migraine headache since approximately .  Initially headaches were managed with NSAID medications as well as eventually sumatriptan for abortive treatment however headaches have slowly increased in frequency and severity over several years.  She is also now unable to take NSAIDs due to stomach issues that have occurred from the chronic use of these medications.    Patient has had both a CT and MRI imaging of the brain which showed some microvascular changes with no other significant abnormalities.  She has not been on any preventative therapies for medication.  She states that sumatriptan has been effective for aborting headache symptoms however has not been working quite as well recently as headaches have become more frequent.  She was prescribed amitriptyline and rizatriptan however has not picked them up as she was wanting to see us for additional consultation prior to starting any new medications.    Patient does report a family history of migraine headache.  She has never had any head injuries.    Current:    Patient is currently reporting approximately 20 days of headache per 30 days at least 8-10 of which are migrainous in nature.  Headaches last at least 4 hours in duration and up to 7 days in duration.   She states that headaches are temporal in nature typically more left sided however can occur as well on the right.  Occasionally she will get neck pain associated with the headaches and does have a history of bulging disks in the neck.  She rates them as a 9 out of 10 on the pain scale.  She does experience sensitivity to light, sound, nausea, vomiting, visual disturbances including blurry or floaters in her vision.  She also experiences mental fogginess and sleep difficulty with the headaches.  She denies any other new neurologic complaints at today's visit.  See headache questionnaire dated 12/17/2024 for additional information.      The following portions of the patient's history were reviewed and updated as appropriate: allergies, current medications, past family history, past medical history, past social history, past surgical history, and problem list.                 Review of Systems   Constitutional:  Positive for fatigue. Negative for activity change, appetite change and unexpected weight change.   HENT:  Negative for hearing loss, tinnitus and trouble swallowing.         Phonophobia   Eyes:  Positive for photophobia and visual disturbance. Negative for pain.   Respiratory:  Negative for chest tightness and shortness of breath.    Cardiovascular:  Negative for palpitations.   Gastrointestinal:  Positive for nausea and vomiting.   Musculoskeletal:  Negative for neck pain.   Neurological:  Positive for headaches. Negative for dizziness, syncope, facial asymmetry, speech difficulty, weakness, light-headedness and numbness.   Psychiatric/Behavioral:  Positive for sleep disturbance. Negative for confusion.             Current Medications:    Current Outpatient Medications   Medication Instructions    amitriptyline (ELAVIL) 10 mg, Oral, Nightly    azelastine (ASTELIN) 0.1 % nasal spray     Carboxymethylcellul-Glycerin 0.5-0.9 % solution Apply  to eye(s) as directed by provider.    Deep Sea Nasal Spray 0.65 %  "nasal spray     dicyclomine (BENTYL) 10 mg, 4 Times Daily Before Meals & Nightly    Ferrous Fumarate 325 mg    fluticasone (FLONASE) 50 MCG/ACT nasal spray     levothyroxine (SYNTHROID, LEVOTHROID) 25 mcg, Oral, Every Early Morning    NON FORMULARY SLIPPERY ELM    NON FORMULARY Red root    omeprazole (PRILOSEC) 20 mg, As Needed    ondansetron (ZOFRAN) 8 mg, Every 8 Hours PRN    Probiotic Product (PROBIOTIC DAILY PO) Take  by mouth.    SODIUM CHLORIDE PO Inhale.    SUMAtriptan (IMITREX) 100 mg, Oral, As Needed, Take one tablet at onset of headache. May repeat dose one time in 2 hours if headache not relieved.    Turmeric 400 MG capsule Take  by mouth.          /64   Pulse 86   Ht 180.3 cm (70.98\")   Wt 62.6 kg (138 lb)   SpO2 100%   BMI 19.26 kg/m²                Objective     Neurological Exam  Mental Status  Awake, alert and oriented to person, place and time. Speech is normal. Language is fluent with no aphasia.    Cranial Nerves  CN II: Visual fields full to confrontation.  CN III, IV, VI: Extraocular movements intact bilaterally. Normal lids and orbits bilaterally. Pupils equal round and reactive to light bilaterally.  CN V: Facial sensation is normal.  CN VII: Full and symmetric facial movement.  CN IX, X: Palate elevates symmetrically  CN XI: Shoulder shrug strength is normal.  CN XII: Tongue midline without atrophy or fasciculations.    Motor  Normal muscle bulk throughout. Normal muscle tone. No abnormal involuntary movements. Strength is 5/5 throughout all four extremities.    Sensory  Sensation is intact to light touch, pinprick, vibration and proprioception in all four extremities.    Reflexes  Deep tendon reflexes are 2+ and symmetric in all four extremities.    Coordination    Finger-to-nose, rapid alternating movements and heel-to-shin normal bilaterally without dysmetria.    Gait  Normal casual, toe, heel and tandem gait.      Physical Exam  Eyes:      General: Lids are normal.      " Extraocular Movements: Extraocular movements intact.      Pupils: Pupils are equal, round, and reactive to light.   Neurological:      Motor: Motor strength is normal.     Coordination: Coordination is intact.      Deep Tendon Reflexes: Reflexes are normal and symmetric.   Psychiatric:         Speech: Speech normal.                     Assessment & Plan     Diagnoses and all orders for this visit:    1. Chronic migraine without aura without status migrainosus, not intractable (Primary)  -     amitriptyline (ELAVIL) 10 MG tablet; Take 1 tablet by mouth Every Night for 180 days.  Dispense: 90 tablet; Refill: 1  -     SUMAtriptan (IMITREX) 100 MG tablet; Take 1 tablet by mouth As Needed for Migraine for up to 180 days. Take one tablet at onset of headache. May repeat dose one time in 2 hours if headache not relieved.  Dispense: 12 tablet; Refill: 5       At this time I would like to start patient on amitriptyline for headache prophylaxis 10 mg at bedtime as this I think will help with some of the neck symptoms as well as headaches.    We will continue sumatriptan for now however increase her to the 100 mg dosing as this medication has been effective for her in the past.    I do not think we need to pursue any additional imaging at this time.    Follow-up in 2 months or sooner if needed.            Jada COOK    Neurology    [facility] [place]    Phone: [phone]    12/17/2024 , 15:07 EST

## 2024-12-20 ENCOUNTER — PATIENT ROUNDING (BHMG ONLY) (OUTPATIENT)
Dept: NEUROLOGY | Facility: CLINIC | Age: 44
End: 2024-12-20
Payer: COMMERCIAL

## 2024-12-23 ENCOUNTER — OFFICE VISIT (OUTPATIENT)
Dept: FAMILY MEDICINE CLINIC | Facility: CLINIC | Age: 44
End: 2024-12-23
Payer: COMMERCIAL

## 2024-12-23 VITALS
SYSTOLIC BLOOD PRESSURE: 114 MMHG | OXYGEN SATURATION: 99 % | HEIGHT: 71 IN | TEMPERATURE: 98.2 F | WEIGHT: 140.2 LBS | BODY MASS INDEX: 19.63 KG/M2 | HEART RATE: 72 BPM | DIASTOLIC BLOOD PRESSURE: 70 MMHG

## 2024-12-23 DIAGNOSIS — Z12.83 SKIN CANCER SCREENING: ICD-10-CM

## 2024-12-23 DIAGNOSIS — D22.9 SKIN MOLE: ICD-10-CM

## 2024-12-23 DIAGNOSIS — Z00.00 ENCOUNTER FOR ANNUAL PHYSICAL EXAM: Primary | ICD-10-CM

## 2024-12-23 PROCEDURE — 99396 PREV VISIT EST AGE 40-64: CPT

## 2024-12-23 RX ORDER — POLYETHYLENE GLYCOL 3350 17 G/17G
17 POWDER, FOR SOLUTION ORAL DAILY
COMMUNITY

## 2024-12-23 NOTE — PROGRESS NOTES
Chief Complaint  Annual Exam    Subjective            Shantel Welsh presents to [facility]     History of Present Illness  43 year old female presents for annual physical. Blood pressure is well controlled at 114/70. She has plans for EGD on 25 with Dr. Brady. She has appt with General Surgery Dr. Rodriguez on 25. Appt with vocal cord specialist on 25. Appt with Endo Dr. Conroy 25. Father recently diagnosed with basal cell and pt is interested in mole removal. Agreeable to Dermatology referral. Denies chest pain and shortness of breath. Denies changes in bowel or bladder habits. She is on daily supplements to regulate bowel movements and following an anti-inflammatory diet. [facility]. States she had normal mammogram in 2024.        CPE- Patient reporting that health is doing well overall.  Patient is here today for annual physical.  Eating a balanced diet.    Labs: Labs drawn 10/8 and     Colorectal cancer screening: N/A  Breast cancer screening: performed in 2024  Cervical cancer screening: performed 2024.    Immunization History   Administered Date(s) Administered    COVID-19 (MODERNA) 12YRS+ (SPIKEVAX) 10/02/2024      Patient has seen dentist within last 6 months  yes  Patient has seen eye specialist within last 6 months yes    PHQ-2 Depression Screening  Little interest or pleasure in doing things?     Feeling down, depressed, or hopeless?     PHQ-2 Total Score        Social History     Socioeconomic History    Marital status: Single   Tobacco Use    Smoking status: Former     Current packs/day: 0.00     Average packs/day: 0.5 packs/day for 6.3 years (3.2 ttl pk-yrs)     Types: Cigarettes     Start date: 9/3/2009     Quit date: 2016     Years since quittin.9    Smokeless tobacco: Never    Tobacco comments:     I was on and off for ten years. Quit many times and didnt always smoke .25 a day. Often 3  "or less   Vaping Use    Vaping status: Never Used   Substance and Sexual Activity    Alcohol use: Not Currently    Drug use: Never    Sexual activity: Not Currently     Birth control/protection: Abstinence          Objective   Vital Signs:   /70 (BP Location: Left arm, Patient Position: Sitting, Cuff Size: Adult)   Pulse 72   Temp 98.2 °F (36.8 °C) (Infrared)   Ht 180.3 cm (70.98\")   Wt 63.6 kg (140 lb 3.2 oz)   SpO2 99%   BMI 19.56 kg/m²         Physical Exam  Constitutional:       Appearance: Normal appearance.   HENT:      Head: Normocephalic.      Right Ear: Tympanic membrane, ear canal and external ear normal.      Left Ear: Tympanic membrane, ear canal and external ear normal.      Nose: Nose normal.      Mouth/Throat:      Mouth: Mucous membranes are moist.      Pharynx: Oropharynx is clear.   Eyes:      Conjunctiva/sclera: Conjunctivae normal.      Pupils: Pupils are equal, round, and reactive to light.   Cardiovascular:      Rate and Rhythm: Normal rate and regular rhythm.      Pulses: Normal pulses.      Heart sounds: Normal heart sounds.   Pulmonary:      Effort: Pulmonary effort is normal.      Breath sounds: Normal breath sounds. No wheezing.   Abdominal:      General: Abdomen is flat. Bowel sounds are normal. There is no distension.      Palpations: Abdomen is soft.      Tenderness: There is no abdominal tenderness.   Musculoskeletal:         General: Normal range of motion.      Cervical back: Normal range of motion.   Skin:     General: Skin is warm.   Neurological:      General: No focal deficit present.      Mental Status: She is alert and oriented to person, place, and time.   Psychiatric:         Mood and Affect: Mood normal.         Behavior: Behavior normal.        Result Review :   The following data was reviewed by: JOEY Braun on 12/23/2024:  Common labs          9/3/2024    14:44 10/8/2024    11:30 11/19/2024    08:20   Common Labs   Glucose 87  92  88    BUN 7  8  11  "   Creatinine 0.70  0.72  0.77    Sodium 143  141  140    Potassium 4.4  3.6  4.6    Chloride 106  101  104    Calcium 9.2  9.5  9.4    Total Protein 6.7   6.2    Albumin 4.6  4.9  4.3    Total Bilirubin 0.6  1.3  1.1    Alkaline Phosphatase 38  50  44    AST (SGOT) 13  15  17    ALT (SGPT) 11  8  15    WBC  3.41  5.7    Hemoglobin  14.7  12.5    Hematocrit  46.0  39.5    Platelets  244  238    Triglycerides   94      Data reviewed : labs             Current Outpatient Medications:     amitriptyline (ELAVIL) 10 MG tablet, Take 1 tablet by mouth Every Night for 180 days., Disp: 90 tablet, Rfl: 1    azelastine (ASTELIN) 0.1 % nasal spray, Administer 1 spray into the nostril(s) as directed by provider As Needed for Rhinitis or Allergies., Disp: , Rfl:     Carboxymethylcellul-Glycerin 0.5-0.9 % solution, Apply  to eye(s) as directed by provider., Disp: , Rfl:     Deep Sea Nasal Spray 0.65 % nasal spray, , Disp: , Rfl:     dicyclomine (BENTYL) 10 MG capsule, Take 1 capsule by mouth As Needed for Abdominal Cramping., Disp: , Rfl:     Ferrous Fumarate 325 (106 Fe) MG tablet, Take 325 mg by mouth., Disp: , Rfl:     fluticasone (FLONASE) 50 MCG/ACT nasal spray, , Disp: , Rfl:     levothyroxine (SYNTHROID, LEVOTHROID) 25 MCG tablet, Take 1 tablet by mouth Every Morning., Disp: 90 tablet, Rfl: 1    NON FORMULARY, SLIPPERY ELM, Disp: , Rfl:     NON FORMULARY, Red root, Disp: , Rfl:     omeprazole (priLOSEC) 20 MG capsule, Take 1 capsule by mouth As Needed. Indications: Gastroesophageal Reflux Disease, Disp: , Rfl:     ondansetron (ZOFRAN) 8 MG tablet, Take 1 tablet by mouth Every 8 (Eight) Hours As Needed for Nausea or Vomiting., Disp: , Rfl:     polyethylene glycol (MiraLax) 17 GM/SCOOP powder, Take 17 g by mouth Daily., Disp: , Rfl:     Probiotic Product (PROBIOTIC DAILY PO), Take  by mouth., Disp: , Rfl:     SODIUM CHLORIDE PO, Inhale., Disp: , Rfl:     SUMAtriptan (IMITREX) 100 MG tablet, Take 1 tablet by mouth As Needed for  Migraine for up to 180 days. Take one tablet at onset of headache. May repeat dose one time in 2 hours if headache not relieved., Disp: 12 tablet, Rfl: 5    Turmeric 400 MG capsule, Take  by mouth., Disp: , Rfl:           Assessment and Plan    Diagnoses and all orders for this visit:    1. Encounter for annual physical exam (Primary)    2. Skin cancer screening  -     Ambulatory Referral to Dermatology    3. Skin mole  -     Ambulatory Referral to Dermatology          The patient was counseled regarding nutrition, physical activity, healthy weight, injury prevention, misuse of tobacco, alcohol and illicit drugs, mental health, immunizations, and screenings.      Follow Up   Return in about 1 year (around 12/23/2025) for Annual physical.  Patient was given instructions and counseling regarding her condition or for health maintenance advice. Please see specific information pulled into the AVS if appropriate.

## 2025-01-10 PROBLEM — E04.2 MULTIPLE THYROID NODULES: Status: ACTIVE | Noted: 2025-01-10

## 2025-01-13 ENCOUNTER — OFFICE VISIT (OUTPATIENT)
Dept: SURGERY | Facility: CLINIC | Age: 45
End: 2025-01-13
Payer: COMMERCIAL

## 2025-01-13 ENCOUNTER — PREP FOR SURGERY (OUTPATIENT)
Dept: OTHER | Facility: HOSPITAL | Age: 45
End: 2025-01-13
Payer: COMMERCIAL

## 2025-01-13 VITALS
SYSTOLIC BLOOD PRESSURE: 124 MMHG | DIASTOLIC BLOOD PRESSURE: 82 MMHG | BODY MASS INDEX: 20.41 KG/M2 | HEART RATE: 90 BPM | HEIGHT: 71 IN | OXYGEN SATURATION: 99 % | WEIGHT: 145.8 LBS

## 2025-01-13 DIAGNOSIS — E06.3 HASHIMOTO'S THYROIDITIS: ICD-10-CM

## 2025-01-13 DIAGNOSIS — E04.2 MULTIPLE THYROID NODULES: Primary | ICD-10-CM

## 2025-01-13 DIAGNOSIS — J38.3 DISORDER OF VOCAL CORD: ICD-10-CM

## 2025-01-13 PROCEDURE — 1160F RVW MEDS BY RX/DR IN RCRD: CPT | Performed by: SURGERY

## 2025-01-13 PROCEDURE — 99204 OFFICE O/P NEW MOD 45 MIN: CPT | Performed by: SURGERY

## 2025-01-13 PROCEDURE — 1159F MED LIST DOCD IN RCRD: CPT | Performed by: SURGERY

## 2025-01-13 RX ORDER — ONDANSETRON 2 MG/ML
4 INJECTION INTRAMUSCULAR; INTRAVENOUS EVERY 6 HOURS PRN
OUTPATIENT
Start: 2025-01-13

## 2025-01-13 RX ORDER — SODIUM CHLORIDE 0.9 % (FLUSH) 0.9 %
3 SYRINGE (ML) INJECTION EVERY 12 HOURS SCHEDULED
OUTPATIENT
Start: 2025-01-13

## 2025-01-13 RX ORDER — OXYCODONE HCL 10 MG/1
10 TABLET, FILM COATED, EXTENDED RELEASE ORAL ONCE
OUTPATIENT
Start: 2025-01-13 | End: 2025-01-13

## 2025-01-13 RX ORDER — SODIUM CHLORIDE 0.9 % (FLUSH) 0.9 %
1-10 SYRINGE (ML) INJECTION AS NEEDED
OUTPATIENT
Start: 2025-01-13

## 2025-01-13 RX ORDER — FAMOTIDINE 20 MG/1
20 TABLET, FILM COATED ORAL DAILY
COMMUNITY

## 2025-01-13 RX ORDER — ACETAMINOPHEN 500 MG
1000 TABLET ORAL ONCE
OUTPATIENT
Start: 2025-01-13 | End: 2025-01-13

## 2025-01-13 RX ORDER — SODIUM CHLORIDE 9 MG/ML
40 INJECTION, SOLUTION INTRAVENOUS AS NEEDED
OUTPATIENT
Start: 2025-01-13

## 2025-01-22 ENCOUNTER — OFFICE VISIT (OUTPATIENT)
Dept: ENDOCRINOLOGY | Age: 45
End: 2025-01-22
Payer: COMMERCIAL

## 2025-01-22 VITALS
DIASTOLIC BLOOD PRESSURE: 78 MMHG | TEMPERATURE: 97.6 F | WEIGHT: 144 LBS | BODY MASS INDEX: 20.16 KG/M2 | HEIGHT: 71 IN | HEART RATE: 73 BPM | OXYGEN SATURATION: 100 % | SYSTOLIC BLOOD PRESSURE: 124 MMHG

## 2025-01-22 DIAGNOSIS — J38.1 VOCAL CORD POLYP: ICD-10-CM

## 2025-01-22 DIAGNOSIS — E55.9 VITAMIN D DEFICIENCY: ICD-10-CM

## 2025-01-22 DIAGNOSIS — E03.9 PRIMARY HYPOTHYROIDISM: ICD-10-CM

## 2025-01-22 DIAGNOSIS — R89.9 ABNORMAL THYROID BIOPSY: ICD-10-CM

## 2025-01-22 DIAGNOSIS — E04.2 MULTIPLE THYROID NODULES: Primary | ICD-10-CM

## 2025-01-22 LAB
25(OH)D3+25(OH)D2 SERPL-MCNC: 29.1 NG/ML (ref 30–100)
ALBUMIN SERPL-MCNC: 4.3 G/DL (ref 3.5–5.2)
ALBUMIN/GLOB SERPL: 1.6 G/DL
ALP SERPL-CCNC: 49 U/L (ref 39–117)
ALT SERPL-CCNC: 65 U/L (ref 1–33)
AST SERPL-CCNC: 61 U/L (ref 1–32)
BILIRUB SERPL-MCNC: 0.9 MG/DL (ref 0–1.2)
BUN SERPL-MCNC: 9 MG/DL (ref 6–20)
BUN/CREAT SERPL: 11.5 (ref 7–25)
CALCIUM SERPL-MCNC: 9.6 MG/DL (ref 8.6–10.5)
CHLORIDE SERPL-SCNC: 104 MMOL/L (ref 98–107)
CO2 SERPL-SCNC: 27.9 MMOL/L (ref 22–29)
CREAT SERPL-MCNC: 0.78 MG/DL (ref 0.57–1)
EGFRCR SERPLBLD CKD-EPI 2021: 96.8 ML/MIN/1.73
GLOBULIN SER CALC-MCNC: 2.7 GM/DL
GLUCOSE SERPL-MCNC: 94 MG/DL (ref 65–99)
POTASSIUM SERPL-SCNC: 4.2 MMOL/L (ref 3.5–5.2)
PROT SERPL-MCNC: 7 G/DL (ref 6–8.5)
SODIUM SERPL-SCNC: 142 MMOL/L (ref 136–145)
T4 FREE SERPL-MCNC: 1.26 NG/DL (ref 0.92–1.68)
TSH SERPL DL<=0.005 MIU/L-ACNC: 0.74 UIU/ML (ref 0.27–4.2)

## 2025-01-22 PROCEDURE — 99214 OFFICE O/P EST MOD 30 MIN: CPT | Performed by: INTERNAL MEDICINE
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
## 2025-01-26 NOTE — PROGRESS NOTES
AST and ALT are mildly elevated.  Instruct patient to see Dr. Brady for further evaluation.  25-hydroxy vitamin D 29.1 ng/mL.  Start OTC multivitamins with vitamin D 1 tablet daily.  Normal free T4.  Normal TSH.  Continue levothyroxine 25 mcg a day.  Copy of labs sent to FABRICIO Hendrickson NP and Dr. Brady  Please notify patient of results and instructions.

## 2025-02-12 ENCOUNTER — PRE-ADMISSION TESTING (OUTPATIENT)
Dept: PREADMISSION TESTING | Facility: HOSPITAL | Age: 45
End: 2025-02-12
Payer: COMMERCIAL

## 2025-02-12 VITALS
DIASTOLIC BLOOD PRESSURE: 69 MMHG | WEIGHT: 153 LBS | HEIGHT: 71 IN | OXYGEN SATURATION: 100 % | TEMPERATURE: 97.8 F | RESPIRATION RATE: 16 BRPM | BODY MASS INDEX: 21.42 KG/M2 | SYSTOLIC BLOOD PRESSURE: 111 MMHG | HEART RATE: 62 BPM

## 2025-02-12 DIAGNOSIS — E06.3 HASHIMOTO'S THYROIDITIS: ICD-10-CM

## 2025-02-12 DIAGNOSIS — J38.3 DISORDER OF VOCAL CORD: ICD-10-CM

## 2025-02-12 DIAGNOSIS — E04.2 MULTIPLE THYROID NODULES: ICD-10-CM

## 2025-02-12 LAB
ABO GROUP BLD: NORMAL
ANION GAP SERPL CALCULATED.3IONS-SCNC: 8.9 MMOL/L (ref 5–15)
BLD GP AB SCN SERPL QL: NEGATIVE
BUN SERPL-MCNC: 7 MG/DL (ref 6–20)
BUN/CREAT SERPL: 8.2 (ref 7–25)
CALCIUM SPEC-SCNC: 9.1 MG/DL (ref 8.6–10.5)
CALCIUM SPEC-SCNC: 9.2 MG/DL (ref 8.6–10.5)
CHLORIDE SERPL-SCNC: 104 MMOL/L (ref 98–107)
CO2 SERPL-SCNC: 27.1 MMOL/L (ref 22–29)
CREAT SERPL-MCNC: 0.85 MG/DL (ref 0.57–1)
DEPRECATED RDW RBC AUTO: 40.6 FL (ref 37–54)
EGFRCR SERPLBLD CKD-EPI 2021: 87.3 ML/MIN/1.73
ERYTHROCYTE [DISTWIDTH] IN BLOOD BY AUTOMATED COUNT: 11.7 % (ref 12.3–15.4)
GLUCOSE SERPL-MCNC: 95 MG/DL (ref 65–99)
HCG SERPL QL: NEGATIVE
HCT VFR BLD AUTO: 35.4 % (ref 34–46.6)
HGB BLD-MCNC: 11.9 G/DL (ref 12–15.9)
MCH RBC QN AUTO: 32.2 PG (ref 26.6–33)
MCHC RBC AUTO-ENTMCNC: 33.6 G/DL (ref 31.5–35.7)
MCV RBC AUTO: 95.7 FL (ref 79–97)
PHOSPHATE SERPL-MCNC: 2.8 MG/DL (ref 2.5–4.5)
PLATELET # BLD AUTO: 219 10*3/MM3 (ref 140–450)
PMV BLD AUTO: 10.4 FL (ref 6–12)
POTASSIUM SERPL-SCNC: 3.8 MMOL/L (ref 3.5–5.2)
PTH-INTACT SERPL-MCNC: 22.8 PG/ML (ref 15–65)
RBC # BLD AUTO: 3.7 10*6/MM3 (ref 3.77–5.28)
RH BLD: POSITIVE
SODIUM SERPL-SCNC: 140 MMOL/L (ref 136–145)
T&S EXPIRATION DATE: NORMAL
TSH SERPL DL<=0.05 MIU/L-ACNC: 1.41 UIU/ML (ref 0.27–4.2)
WBC NRBC COR # BLD AUTO: 4.38 10*3/MM3 (ref 3.4–10.8)

## 2025-02-12 PROCEDURE — 83970 ASSAY OF PARATHORMONE: CPT

## 2025-02-12 PROCEDURE — 84443 ASSAY THYROID STIM HORMONE: CPT

## 2025-02-12 PROCEDURE — 85027 COMPLETE CBC AUTOMATED: CPT

## 2025-02-12 PROCEDURE — 86850 RBC ANTIBODY SCREEN: CPT

## 2025-02-12 PROCEDURE — 80048 BASIC METABOLIC PNL TOTAL CA: CPT

## 2025-02-12 PROCEDURE — 86900 BLOOD TYPING SEROLOGIC ABO: CPT

## 2025-02-12 PROCEDURE — 84100 ASSAY OF PHOSPHORUS: CPT

## 2025-02-12 PROCEDURE — 82308 ASSAY OF CALCITONIN: CPT

## 2025-02-12 PROCEDURE — 36415 COLL VENOUS BLD VENIPUNCTURE: CPT

## 2025-02-12 PROCEDURE — 86901 BLOOD TYPING SEROLOGIC RH(D): CPT

## 2025-02-12 PROCEDURE — 84703 CHORIONIC GONADOTROPIN ASSAY: CPT

## 2025-02-12 PROCEDURE — 84432 ASSAY OF THYROGLOBULIN: CPT

## 2025-02-12 NOTE — DISCHARGE INSTRUCTIONS
Take the following medications the morning of surgery:  LEVOTHYROXINE          If you are on prescription narcotic pain medication to control your pain you may also take that medication the morning of surgery.      General Instructions:     Do not eat solid food after midnight the night before surgery.  Clear liquids day of surgery are allowed but must be stopped at least two hours before your hospital arrival time.       Allowed clear liquids      Water, sodas, and tea or coffee with no cream or milk added.       12 to 20 ounces of a clear liquid that contains carbohydrates is recommended.  If non-diabetic, have Gatorade or Powerade.  If diabetic, have G2 or Powerade Zero.     Do not have liquids red in color.  Do not consume chicken, beef, pork or vegetable broth or bouillon cubes of any variety as they are not considered clear liquids and are not allowed.        Patients who avoid smoking, chewing tobacco and alcohol for 4 weeks prior to surgery have a reduced risk of post-operative complications.  Quit smoking as many days before surgery as you can.  Do not smoke, use chewing tobacco or drink alcohol the day of surgery.   If applicable bring your C-PAP/ BI-PAP machine in with you to preop day of surgery.  Bring any papers given to you in the doctor’s office.  Wear clean comfortable clothes.  Do not wear contact lenses, false eyelashes or make-up.  Bring a case for your glasses.   Bring crutches or walker if applicable.  Remove all piercings.  Leave jewelry and any other valuables at home.  Hair extensions with metal clips must be removed prior to surgery.  The Pre-Admission Testing nurse will instruct you to bring medications if unable to obtain an accurate list in Pre-Admission Testing.        If you were given a blood bank ID arm band remember to bring it with you the day of surgery.    Preventing a Surgical Site Infection:  For 2 to 3 days before surgery, avoid shaving with a razor because the razor can  irritate skin and make it easier to develop an infection.    Any areas of open skin can increase the risk of a post-operative wound infection by allowing bacteria to enter and travel throughout the body.  Notify your surgeon if you have any skin wounds / rashes even if it is not near the expected surgical site.  The area will need assessed to determine if surgery should be delayed until it is healed.  The night prior to surgery shower using a fresh bar of anti-bacterial soap (such as Dial) and clean washcloth.  Sleep in a clean bed with clean clothing.  Do not allow pets to sleep with you.  Shower on the morning of surgery using a fresh bar of anti-bacterial soap (such as Dial) and clean washcloth.  Dry with a clean towel and dress in clean clothing.  Ask your surgeon if you will be receiving antibiotics prior to surgery.  Make sure you, your family, and all healthcare providers clean their hands with soap and water or an alcohol based hand  before caring for you or your wound.      CHLORHEXIDINE CLOTH INSTRUCTIONS  The morning of surgery follow these instructions using the Chlorhexidine cloths you've been given.  These steps reduce bacteria on the body.  Do not use the cloths near your eyes, ears mouth, genitalia or on open wounds.  Throw the cloths away after use but do not try to flush them down a toilet.      Open and remove one cloth at a time from the package.    Leave the cloth unfolded and begin the bathing.  Massage the skin with the cloths using gentle pressure to remove bacteria.  Do not scrub harshly.   Follow the steps below with one 2% CHG cloth per area (6 total cloths).  One cloth for neck, shoulders and chest.  One cloth for both arms, hands, fingers and underarms (do underarms last).  One cloth for the abdomen followed by groin.  One cloth for right leg and foot including between the toes.  One cloth for left leg and foot including between the toes.  The last cloth is to be used for the  back of the neck, back and buttocks.    Allow the CHG to air dry 3 minutes on the skin which will give it time to work and decrease the chance of irritation.  The skin may feel sticky until it is dry.  Do not rinse with water or any other liquid or you will lose the beneficial effects of the CHG.  If mild skin irritation occurs, do rinse the skin to remove the CHG.  Report this to the nurse at time of admission.  Do not apply lotions, creams, ointments, deodorants or perfumes after using the clothes. Dress in clean clothes before coming to the hospital.        Day of surgery:  Your arrival time is approximately two hours before your scheduled surgery time.  Please note if you have an early arrival time the surgery doors do not open before 5:00 AM.  Upon arrival, a Pre-op nurse and Anesthesiologist will review your health history, obtain vital signs, and answer questions you may have.  The only belongings needed at this time will be a list of your home medications and if applicable your C-PAP/BI-PAP machine.  A Pre-op nurse will start an IV and you may receive medication in preparation for surgery, including something to help you relax.     Please be aware that surgery does come with discomfort.  We want to make every effort to control your discomfort so please discuss any uncontrolled symptoms with your nurse.   Your doctor will most likely have prescribed pain medications.      If you are going home after surgery you will receive individualized written care instructions before being discharged.  A responsible adult must drive you to and from the hospital on the day of your surgery and ideally stay with you through the night.   .  Discharge prescriptions can be filled by the hospital pharmacy during regular pharmacy hours.  If you are having surgery late in the day/evening your prescription may be e-prescribed to your pharmacy.  Please verify your pharmacy hours or chose a 24 hour pharmacy to avoid not having access  to your prescription because your pharmacy has closed for the day.    If you are staying overnight following surgery, you will be transported to your hospital room following the recovery period.  [facility] has all private rooms.    If you have any questions please call Pre-Admission Testing at [phone].  Deductibles and co-payments are collected on the day of service. Please be prepared to pay the required co-pay, deductible or deposit on the day of service as defined by your plan.    Call your surgeon immediately if you experience any of the following symptoms:  Sore Throat  Shortness of Breath or difficulty breathing  Cough  Chills  Body soreness or muscle pain  Headache  Fever  New loss of taste or smell  Do not arrive for your surgery ill.  Your procedure will need to be rescheduled to another time.  You will need to call your physician before the day of surgery to avoid any unnecessary exposure to hospital staff as well as other patients.

## 2025-02-14 ENCOUNTER — ANESTHESIA EVENT (OUTPATIENT)
Dept: GASTROENTEROLOGY | Facility: HOSPITAL | Age: 45
End: 2025-02-14
Payer: COMMERCIAL

## 2025-02-14 ENCOUNTER — HOSPITAL ENCOUNTER (OUTPATIENT)
Facility: HOSPITAL | Age: 45
Setting detail: HOSPITAL OUTPATIENT SURGERY
Discharge: HOME | End: 2025-02-14
Attending: INTERNAL MEDICINE | Admitting: INTERNAL MEDICINE
Payer: COMMERCIAL

## 2025-02-14 ENCOUNTER — ANESTHESIA (OUTPATIENT)
Dept: GASTROENTEROLOGY | Facility: HOSPITAL | Age: 45
End: 2025-02-14
Payer: COMMERCIAL

## 2025-02-14 VITALS
HEART RATE: 60 BPM | OXYGEN SATURATION: 100 % | DIASTOLIC BLOOD PRESSURE: 81 MMHG | WEIGHT: 154 LBS | RESPIRATION RATE: 16 BRPM | SYSTOLIC BLOOD PRESSURE: 130 MMHG | BODY MASS INDEX: 21.56 KG/M2 | HEIGHT: 71 IN

## 2025-02-14 DIAGNOSIS — R63.4 WEIGHT LOSS, ABNORMAL: ICD-10-CM

## 2025-02-14 DIAGNOSIS — K58.1 IRRITABLE BOWEL SYNDROME WITH CONSTIPATION: ICD-10-CM

## 2025-02-14 DIAGNOSIS — R10.31 RLQ ABDOMINAL PAIN: ICD-10-CM

## 2025-02-14 LAB — CALCIT SERPL-MCNC: <2 PG/ML (ref 0–5)

## 2025-02-14 PROCEDURE — 43239 EGD BIOPSY SINGLE/MULTIPLE: CPT | Performed by: INTERNAL MEDICINE

## 2025-02-14 PROCEDURE — 25810000003 LACTATED RINGERS PER 1000 ML: Performed by: NURSE ANESTHETIST, CERTIFIED REGISTERED

## 2025-02-14 PROCEDURE — 88305 TISSUE EXAM BY PATHOLOGIST: CPT | Performed by: INTERNAL MEDICINE

## 2025-02-14 PROCEDURE — 25010000002 LIDOCAINE 2% SOLUTION: Performed by: NURSE ANESTHETIST, CERTIFIED REGISTERED

## 2025-02-14 PROCEDURE — 25010000002 GLYCOPYRROLATE 0.2 MG/ML SOLUTION: Performed by: NURSE ANESTHETIST, CERTIFIED REGISTERED

## 2025-02-14 PROCEDURE — 25010000002 PROPOFOL 1000 MG/100ML EMULSION: Performed by: NURSE ANESTHETIST, CERTIFIED REGISTERED

## 2025-02-14 PROCEDURE — S0260 H&P FOR SURGERY: HCPCS | Performed by: INTERNAL MEDICINE

## 2025-02-14 PROCEDURE — 25810000003 LACTATED RINGERS PER 1000 ML: Performed by: INTERNAL MEDICINE

## 2025-02-14 RX ORDER — SODIUM CHLORIDE, SODIUM LACTATE, POTASSIUM CHLORIDE, CALCIUM CHLORIDE 600; 310; 30; 20 MG/100ML; MG/100ML; MG/100ML; MG/100ML
30 INJECTION, SOLUTION INTRAVENOUS CONTINUOUS
Status: DISCONTINUED | OUTPATIENT
Start: 2025-02-14 | End: 2025-02-14 | Stop reason: HOSPADM

## 2025-02-14 RX ORDER — GLYCOPYRROLATE 0.2 MG/ML
INJECTION INTRAMUSCULAR; INTRAVENOUS AS NEEDED
Status: DISCONTINUED | OUTPATIENT
Start: 2025-02-14 | End: 2025-02-14 | Stop reason: SURG

## 2025-02-14 RX ORDER — LIDOCAINE HYDROCHLORIDE 20 MG/ML
INJECTION, SOLUTION INFILTRATION; PERINEURAL AS NEEDED
Status: DISCONTINUED | OUTPATIENT
Start: 2025-02-14 | End: 2025-02-14 | Stop reason: SURG

## 2025-02-14 RX ORDER — PROPOFOL 10 MG/ML
INJECTION, EMULSION INTRAVENOUS AS NEEDED
Status: DISCONTINUED | OUTPATIENT
Start: 2025-02-14 | End: 2025-02-14 | Stop reason: SURG

## 2025-02-14 RX ORDER — SODIUM CHLORIDE, SODIUM LACTATE, POTASSIUM CHLORIDE, CALCIUM CHLORIDE 600; 310; 30; 20 MG/100ML; MG/100ML; MG/100ML; MG/100ML
INJECTION, SOLUTION INTRAVENOUS CONTINUOUS PRN
Status: DISCONTINUED | OUTPATIENT
Start: 2025-02-14 | End: 2025-02-14 | Stop reason: SURG

## 2025-02-14 RX ADMIN — GLYCOPYRROLATE 0.2 MG: 0.2 INJECTION INTRAMUSCULAR; INTRAVENOUS at 10:03

## 2025-02-14 RX ADMIN — LIDOCAINE HYDROCHLORIDE 100 MG: 20 INJECTION, SOLUTION INFILTRATION; PERINEURAL at 10:07

## 2025-02-14 RX ADMIN — LIDOCAINE HYDROCHLORIDE 20 MG: 20 INJECTION, SOLUTION INFILTRATION; PERINEURAL at 10:08

## 2025-02-14 RX ADMIN — PROPOFOL INJECTABLE EMULSION 100 MG: 10 INJECTION, EMULSION INTRAVENOUS at 10:07

## 2025-02-14 RX ADMIN — SODIUM CHLORIDE, POTASSIUM CHLORIDE, SODIUM LACTATE AND CALCIUM CHLORIDE 30 ML/HR: 600; 310; 30; 20 INJECTION, SOLUTION INTRAVENOUS at 08:51

## 2025-02-14 RX ADMIN — SODIUM CHLORIDE, POTASSIUM CHLORIDE, SODIUM LACTATE AND CALCIUM CHLORIDE: 600; 310; 30; 20 INJECTION, SOLUTION INTRAVENOUS at 10:02

## 2025-02-14 RX ADMIN — LIDOCAINE HYDROCHLORIDE 20 MG: 20 INJECTION, SOLUTION INFILTRATION; PERINEURAL at 10:09

## 2025-02-14 RX ADMIN — LIDOCAINE HYDROCHLORIDE 10 MG: 20 INJECTION, SOLUTION INFILTRATION; PERINEURAL at 10:10

## 2025-02-14 NOTE — ANESTHESIA POSTPROCEDURE EVALUATION
"Patient: Shantel Welsh    Procedure Summary       Date: 02/14/25 Room / Location:  [facility] /  [facility]    Anesthesia Start: 1002 Anesthesia Stop: 1018    Procedure: ESOPHAGOGASTRODUODENOSCOPY with bx (Esophagus) Diagnosis:       Weight loss, abnormal      RLQ abdominal pain      Irritable bowel syndrome with constipation      (Weight loss, abnormal [R63.4])      (RLQ abdominal pain [R10.31])      (Irritable bowel syndrome with constipation [K58.1])    Surgeons: Ortiz Brady MD Provider: Rajinder Rolle MD    Anesthesia Type: MAC ASA Status: 2            Anesthesia Type: MAC    Vitals  Vitals Value Taken Time   /81 02/14/25 1038   Temp     Pulse 61 02/14/25 1039   Resp 16 02/14/25 1037   SpO2 100 % 02/14/25 1039   Vitals shown include unfiled device data.        Post Anesthesia Care and Evaluation    Patient location during evaluation: bedside  Patient participation: complete - patient participated  Level of consciousness: awake and alert  Pain management: adequate    Airway patency: patent  Anesthetic complications: No anesthetic complications  PONV Status: controlled  Cardiovascular status: acceptable and hemodynamically stable  Respiratory status: acceptable, spontaneous ventilation and nonlabored ventilation  Hydration status: acceptable    Comments: /81 (BP Location: Left arm, Patient Position: Lying)   Pulse 60   Resp 16   Ht 180.3 cm (71\")   Wt 69.9 kg (154 lb)   SpO2 100%   BMI 21.48 kg/m²       "

## 2025-02-14 NOTE — ANESTHESIA PREPROCEDURE EVALUATION
Anesthesia Evaluation     Patient summary reviewed and Nursing notes reviewed   no history of anesthetic complications:   NPO Solid Status: > 8 hours  NPO Liquid Status: > 2 hours           Airway   Dental      Pulmonary    (+) asthma,  Cardiovascular         Neuro/Psych  GI/Hepatic/Renal/Endo    (+) GERD, thyroid problem hypothyroidism    Musculoskeletal     Abdominal    Substance History      OB/GYN          Other                          Anesthesia Plan    ASA 2     MAC     intravenous induction     Anesthetic plan, risks, benefits, and alternatives have been provided, discussed and informed consent has been obtained with: patient.        CODE STATUS:

## 2025-02-14 NOTE — DISCHARGE INSTRUCTIONS
For the next 24 hours patient needs to be with a responsible adult.    For 24 hours DO NOT drive, operate machinery, appliances, drink alcohol, make important decisions or sign legal documents.    Start with a light or bland diet if you are feeling sick to your stomach otherwise advance to regular diet as tolerated.    Follow recommendations on procedure report if provided by your doctor.    Call Dr Brady for problems . Problems may include but not limited to: large amounts of bleeding, trouble breathing, repeated vomiting, severe unrelieved pain, fever or chills.

## 2025-02-14 NOTE — H&P
[facility]  Pre Procedure History & Physical    Chief Complaint:   Time for myegd    Subjective     HPI:   45 y.o. female presenting to endoscopy unit today for egd    Past Medical History:   Past Medical History:   Diagnosis Date    ADD (attention deficit disorder)     Anemia this year    folate and iron deficient    Anesthesia complication     [facility]    Anxiety     Asthma 2005    Excerise induced asthma attacks living in [place]    Colon polyp recent    Constipation     Diverticulosis August 2024    Fatty liver maybe?    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 Age 92    Dementia Maternal Grandmother     Arthritis Maternal Grandmother     Pancreatic cancer Maternal Grandfather     Dementia Paternal Grandmother     Malig Hyperthermia Neg Hx        Social History:   reports  that she quit smoking about 9 years ago. Her smoking use included cigarettes. She started smoking about 15 years ago. She has a 3.2 pack-year smoking history. She has never used smokeless tobacco. She reports that she does not currently use alcohol. She reports that she does not use drugs.    Medications:   Medications Prior to Admission   Medication Sig Dispense Refill Last Dose/Taking    dicyclomine (BENTYL) 10 MG capsule Take 1 capsule by mouth As Needed for Abdominal Cramping.   Past Week    famotidine (PEPCID) 20 MG tablet Take 1 tablet by mouth Every Night.   2/13/2025    Ferrous Fumarate 325 (106 Fe) MG tablet Take 325 mg by mouth. (Patient taking differently: Take 325 mg by mouth Every Other Day.)   Past Week    levothyroxine (SYNTHROID, LEVOTHROID) 25 MCG tablet Take 1 tablet by mouth Every Morning. 90 tablet 1 2/14/2025 Morning    azelastine (ASTELIN) 0.1 % nasal spray Administer 1 spray into the nostril(s) as directed by provider As Needed for Rhinitis or Allergies.       Carboxymethylcellul-Glycerin 0.5-0.9 % solution Apply 2 drops to eye(s) as directed by provider 2 (Two) Times a Day.       Deep Sea Nasal Spray 0.65 % nasal spray Administer 1 spray into the nostril(s) as directed by provider Daily.       fluticasone (FLONASE) 50 MCG/ACT nasal spray        NON FORMULARY 2 (Two) Times a Day. SLIPPERY ELM       ondansetron (ZOFRAN) 8 MG tablet Take 1 tablet by mouth Every 8 (Eight) Hours As Needed for Nausea or Vomiting.       polyethylene glycol (MiraLax) 17 GM/SCOOP powder Take 17 g by mouth Daily.       Probiotic Product (PROBIOTIC DAILY PO) Take  by mouth. (Patient taking differently: Take 1 tablet by mouth Daily.)       SODIUM CHLORIDE PO Inhale.       SUMAtriptan (IMITREX) 100 MG tablet Take 1 tablet by mouth As Needed for Migraine for up to 180 days. Take one tablet at onset of headache. May repeat dose one time in 2 hours if headache not relieved. 12 tablet 5 Unknown    Turmeric 400 MG capsule Take  " by mouth. (Patient taking differently: Take 400 mg by mouth Daily. HOLD)          Allergies:  Patient has no known allergies.    Objective     Blood pressure 115/75, pulse 59, resp. rate 15, height 180.3 cm (71\"), weight 69.9 kg (154 lb), SpO2 100%.  Physical Exam:   General: patient awake, alert and cooperative    Assessment & Plan     Diagnosis:  bloat    Anticipated Surgical Procedure:  egd    The risks, benefits, and alternatives of this procedure have been discussed with the patient or the responsible party- the patient understands and agrees to proceed.                                                                 "

## 2025-02-17 LAB
CYTO UR: NORMAL
LAB AP CASE REPORT: NORMAL
PATH REPORT.FINAL DX SPEC: NORMAL
PATH REPORT.GROSS SPEC: NORMAL

## 2025-02-18 ENCOUNTER — TELEPHONE (OUTPATIENT)
Dept: GASTROENTEROLOGY | Facility: CLINIC | Age: 45
End: 2025-02-18
Payer: COMMERCIAL

## 2025-02-18 LAB — THYROGLOB SERPL-MCNC: 17 NG/ML

## 2025-02-18 NOTE — TELEPHONE ENCOUNTER
----- Message from Ortiz Brady sent at 2/18/2025  1:51 PM EST -----  Pathology benign  Office visit GRANT 6 to 8 weeks

## 2025-02-18 NOTE — TELEPHONE ENCOUNTER
Pt reviewed results via Digi[employer].     Sent pt [employer] msg advising of results and recommendations. Advised to call if any questions.

## 2025-02-20 ENCOUNTER — ANESTHESIA EVENT (OUTPATIENT)
Dept: PERIOP | Facility: HOSPITAL | Age: 45
End: 2025-02-20
Payer: COMMERCIAL

## 2025-02-20 ENCOUNTER — HOSPITAL ENCOUNTER (OUTPATIENT)
Facility: HOSPITAL | Age: 45
Setting detail: HOSPITAL OUTPATIENT SURGERY
Discharge: HOME | End: 2025-02-20
Attending: SURGERY | Admitting: SURGERY
Payer: COMMERCIAL

## 2025-02-20 ENCOUNTER — HOSPITAL ENCOUNTER (OUTPATIENT)
Dept: NUCLEAR MEDICINE | Facility: HOSPITAL | Age: 45
Discharge: HOME | End: 2025-02-20
Payer: COMMERCIAL

## 2025-02-20 ENCOUNTER — ANESTHESIA (OUTPATIENT)
Dept: PERIOP | Facility: HOSPITAL | Age: 45
End: 2025-02-20
Payer: COMMERCIAL

## 2025-02-20 VITALS
BODY MASS INDEX: 21 KG/M2 | RESPIRATION RATE: 16 BRPM | HEIGHT: 71 IN | DIASTOLIC BLOOD PRESSURE: 87 MMHG | SYSTOLIC BLOOD PRESSURE: 128 MMHG | OXYGEN SATURATION: 98 % | TEMPERATURE: 99.2 F | HEART RATE: 59 BPM | WEIGHT: 150 LBS

## 2025-02-20 DIAGNOSIS — E06.3 HASHIMOTO'S THYROIDITIS: ICD-10-CM

## 2025-02-20 DIAGNOSIS — J38.3 DISORDER OF VOCAL CORD: ICD-10-CM

## 2025-02-20 DIAGNOSIS — E55.9 VITAMIN D DEFICIENCY: Primary | ICD-10-CM

## 2025-02-20 DIAGNOSIS — E04.2 MULTIPLE THYROID NODULES: ICD-10-CM

## 2025-02-20 LAB
B-HCG UR QL: NEGATIVE
EXPIRATION DATE: NORMAL
INTERNAL NEGATIVE CONTROL: NEGATIVE
INTERNAL POSITIVE CONTROL: POSITIVE
Lab: NORMAL
PTH-INTACT SERPL-SCNC: 52.6 PG/ML (ref 15–65)
PTH-INTACT SERPL-SCNC: 8.6 PG/ML (ref 15–65)

## 2025-02-20 PROCEDURE — 60512 AUTOTRANSPLANT PARATHYROID: CPT | Performed by: SURGERY

## 2025-02-20 PROCEDURE — 88307 TISSUE EXAM BY PATHOLOGIST: CPT | Performed by: SURGERY

## 2025-02-20 PROCEDURE — 25010000002 DEXAMETHASONE SODIUM PHOSPHATE 20 MG/5ML SOLUTION: Performed by: NURSE ANESTHETIST, CERTIFIED REGISTERED

## 2025-02-20 PROCEDURE — 25010000002 HYDROMORPHONE PER 4 MG: Performed by: NURSE ANESTHETIST, CERTIFIED REGISTERED

## 2025-02-20 PROCEDURE — 25810000003 LACTATED RINGERS PER 1000 ML: Performed by: STUDENT IN AN ORGANIZED HEALTH CARE EDUCATION/TRAINING PROGRAM

## 2025-02-20 PROCEDURE — A9500 TC99M SESTAMIBI: HCPCS | Performed by: SURGERY

## 2025-02-20 PROCEDURE — 78808 IV INJ RA DRUG DX STUDY: CPT

## 2025-02-20 PROCEDURE — 25010000002 CEFAZOLIN PER 500 MG: Performed by: SURGERY

## 2025-02-20 PROCEDURE — 88305 TISSUE EXAM BY PATHOLOGIST: CPT | Performed by: SURGERY

## 2025-02-20 PROCEDURE — 60240 REMOVAL OF THYROID: CPT | Performed by: SURGERY

## 2025-02-20 PROCEDURE — 25010000002 LIDOCAINE 2% SOLUTION: Performed by: NURSE ANESTHETIST, CERTIFIED REGISTERED

## 2025-02-20 PROCEDURE — 25010000002 FENTANYL CITRATE (PF) 50 MCG/ML SOLUTION: Performed by: STUDENT IN AN ORGANIZED HEALTH CARE EDUCATION/TRAINING PROGRAM

## 2025-02-20 PROCEDURE — 25010000002 ONDANSETRON PER 1 MG: Performed by: NURSE ANESTHETIST, CERTIFIED REGISTERED

## 2025-02-20 PROCEDURE — 34310000005 TECHNETIUM SESTAMIBI: Performed by: SURGERY

## 2025-02-20 PROCEDURE — 81025 URINE PREGNANCY TEST: CPT | Performed by: SURGERY

## 2025-02-20 PROCEDURE — 25010000002 PROPOFOL 10 MG/ML EMULSION: Performed by: NURSE ANESTHETIST, CERTIFIED REGISTERED

## 2025-02-20 PROCEDURE — 83970 ASSAY OF PARATHORMONE: CPT | Performed by: SURGERY

## 2025-02-20 PROCEDURE — 88331 PATH CONSLTJ SURG 1 BLK 1SPC: CPT | Performed by: SURGERY

## 2025-02-20 DEVICE — HORIZON TI SMALL RED  24 CLIPS/POUCH
Type: IMPLANTABLE DEVICE | Site: NECK | Status: FUNCTIONAL
Brand: WECK

## 2025-02-20 DEVICE — ABSORBABLE HEMOSTAT (OXIDIZED REGENERATED CELLULOSE)
Type: IMPLANTABLE DEVICE | Site: NECK | Status: FUNCTIONAL
Brand: SURGICEL

## 2025-02-20 DEVICE — ARISTA AH ABSORBABLE HEMOSTATIC PARTICLES, 3G BELLOWS CONTAINER
Type: IMPLANTABLE DEVICE | Site: NECK | Status: FUNCTIONAL
Brand: ARISTA

## 2025-02-20 DEVICE — HORIZON TI MED 24 CLIPS/POUCH
Type: IMPLANTABLE DEVICE | Site: NECK | Status: FUNCTIONAL
Brand: WECK

## 2025-02-20 RX ORDER — MIDAZOLAM HYDROCHLORIDE 1 MG/ML
1 INJECTION, SOLUTION INTRAMUSCULAR; INTRAVENOUS
Status: DISCONTINUED | OUTPATIENT
Start: 2025-02-20 | End: 2025-02-20 | Stop reason: HOSPADM

## 2025-02-20 RX ORDER — FAMOTIDINE 10 MG/ML
20 INJECTION, SOLUTION INTRAVENOUS ONCE
Status: DISCONTINUED | OUTPATIENT
Start: 2025-02-20 | End: 2025-02-20 | Stop reason: HOSPADM

## 2025-02-20 RX ORDER — CALCITRIOL 0.25 UG/1
0.25 CAPSULE, LIQUID FILLED ORAL 2 TIMES DAILY
Qty: 60 CAPSULE | Refills: 0 | Status: SHIPPED | OUTPATIENT
Start: 2025-02-20 | End: 2025-03-22

## 2025-02-20 RX ORDER — SODIUM CHLORIDE 0.9 % (FLUSH) 0.9 %
3 SYRINGE (ML) INJECTION EVERY 12 HOURS SCHEDULED
Status: DISCONTINUED | OUTPATIENT
Start: 2025-02-20 | End: 2025-02-20 | Stop reason: HOSPADM

## 2025-02-20 RX ORDER — IBUPROFEN 400 MG/1
400 TABLET, FILM COATED ORAL
Qty: 6 TABLET | Refills: 0 | Status: SHIPPED | OUTPATIENT
Start: 2025-02-20 | End: 2025-02-23

## 2025-02-20 RX ORDER — ONDANSETRON 2 MG/ML
4 INJECTION INTRAMUSCULAR; INTRAVENOUS ONCE AS NEEDED
Status: COMPLETED | OUTPATIENT
Start: 2025-02-20 | End: 2025-02-20

## 2025-02-20 RX ORDER — ONDANSETRON 2 MG/ML
INJECTION INTRAMUSCULAR; INTRAVENOUS AS NEEDED
Status: DISCONTINUED | OUTPATIENT
Start: 2025-02-20 | End: 2025-02-20 | Stop reason: SURG

## 2025-02-20 RX ORDER — LIDOCAINE HYDROCHLORIDE 20 MG/ML
INJECTION, SOLUTION INFILTRATION; PERINEURAL AS NEEDED
Status: DISCONTINUED | OUTPATIENT
Start: 2025-02-20 | End: 2025-02-20 | Stop reason: SURG

## 2025-02-20 RX ORDER — SUCCINYLCHOLINE/SOD CL,ISO/PF 200MG/10ML
SYRINGE (ML) INTRAVENOUS AS NEEDED
Status: DISCONTINUED | OUTPATIENT
Start: 2025-02-20 | End: 2025-02-20 | Stop reason: SURG

## 2025-02-20 RX ORDER — DIPHENHYDRAMINE HYDROCHLORIDE 50 MG/ML
12.5 INJECTION INTRAMUSCULAR; INTRAVENOUS
Status: DISCONTINUED | OUTPATIENT
Start: 2025-02-20 | End: 2025-02-20 | Stop reason: HOSPADM

## 2025-02-20 RX ORDER — IPRATROPIUM BROMIDE AND ALBUTEROL SULFATE 2.5; .5 MG/3ML; MG/3ML
3 SOLUTION RESPIRATORY (INHALATION) ONCE AS NEEDED
Status: DISCONTINUED | OUTPATIENT
Start: 2025-02-20 | End: 2025-02-20 | Stop reason: HOSPADM

## 2025-02-20 RX ORDER — LEVOTHYROXINE SODIUM 75 UG/1
75 TABLET ORAL DAILY
Qty: 30 TABLET | Refills: 1 | Status: SHIPPED | OUTPATIENT
Start: 2025-02-20 | End: 2026-02-20

## 2025-02-20 RX ORDER — MIDAZOLAM HYDROCHLORIDE 1 MG/ML
0.5 INJECTION, SOLUTION INTRAMUSCULAR; INTRAVENOUS
Status: DISCONTINUED | OUTPATIENT
Start: 2025-02-20 | End: 2025-02-20 | Stop reason: HOSPADM

## 2025-02-20 RX ORDER — HYDROMORPHONE HYDROCHLORIDE 1 MG/ML
0.5 INJECTION, SOLUTION INTRAMUSCULAR; INTRAVENOUS; SUBCUTANEOUS
Status: DISCONTINUED | OUTPATIENT
Start: 2025-02-20 | End: 2025-02-20 | Stop reason: HOSPADM

## 2025-02-20 RX ORDER — SODIUM CHLORIDE 9 MG/ML
40 INJECTION, SOLUTION INTRAVENOUS AS NEEDED
Status: DISCONTINUED | OUTPATIENT
Start: 2025-02-20 | End: 2025-02-20 | Stop reason: HOSPADM

## 2025-02-20 RX ORDER — SCOPOLAMINE 1 MG/3D
1 PATCH, EXTENDED RELEASE TRANSDERMAL
Status: DISCONTINUED | OUTPATIENT
Start: 2025-02-20 | End: 2025-02-20 | Stop reason: HOSPADM

## 2025-02-20 RX ORDER — ONDANSETRON 2 MG/ML
4 INJECTION INTRAMUSCULAR; INTRAVENOUS EVERY 6 HOURS PRN
Status: DISCONTINUED | OUTPATIENT
Start: 2025-02-20 | End: 2025-02-20 | Stop reason: HOSPADM

## 2025-02-20 RX ORDER — SODIUM CHLORIDE, SODIUM LACTATE, POTASSIUM CHLORIDE, CALCIUM CHLORIDE 600; 310; 30; 20 MG/100ML; MG/100ML; MG/100ML; MG/100ML
9 INJECTION, SOLUTION INTRAVENOUS CONTINUOUS
Status: DISCONTINUED | OUTPATIENT
Start: 2025-02-20 | End: 2025-02-20 | Stop reason: HOSPADM

## 2025-02-20 RX ORDER — FENTANYL CITRATE 50 UG/ML
50 INJECTION, SOLUTION INTRAMUSCULAR; INTRAVENOUS ONCE AS NEEDED
Status: COMPLETED | OUTPATIENT
Start: 2025-02-20 | End: 2025-02-20

## 2025-02-20 RX ORDER — ERGOCALCIFEROL (VITAMIN D2) 10 MCG
400 TABLET ORAL DAILY
COMMUNITY

## 2025-02-20 RX ORDER — SODIUM CHLORIDE 0.9 % (FLUSH) 0.9 %
3-10 SYRINGE (ML) INJECTION AS NEEDED
Status: DISCONTINUED | OUTPATIENT
Start: 2025-02-20 | End: 2025-02-20 | Stop reason: HOSPADM

## 2025-02-20 RX ORDER — FLUMAZENIL 0.1 MG/ML
0.2 INJECTION INTRAVENOUS AS NEEDED
Status: DISCONTINUED | OUTPATIENT
Start: 2025-02-20 | End: 2025-02-20 | Stop reason: HOSPADM

## 2025-02-20 RX ORDER — TRAMADOL HYDROCHLORIDE 50 MG/1
50 TABLET ORAL NIGHTLY PRN
Qty: 7 TABLET | Refills: 0 | Status: SHIPPED | OUTPATIENT
Start: 2025-02-20 | End: 2025-02-27

## 2025-02-20 RX ORDER — ATROPINE SULFATE 0.4 MG/ML
0.4 INJECTION, SOLUTION INTRAMUSCULAR; INTRAVENOUS; SUBCUTANEOUS ONCE AS NEEDED
Status: DISCONTINUED | OUTPATIENT
Start: 2025-02-20 | End: 2025-02-20 | Stop reason: HOSPADM

## 2025-02-20 RX ORDER — ACETAMINOPHEN 500 MG
1000 TABLET ORAL ONCE
Status: COMPLETED | OUTPATIENT
Start: 2025-02-20 | End: 2025-02-20

## 2025-02-20 RX ORDER — OXYCODONE HCL 10 MG/1
10 TABLET, FILM COATED, EXTENDED RELEASE ORAL ONCE
Status: COMPLETED | OUTPATIENT
Start: 2025-02-20 | End: 2025-02-20

## 2025-02-20 RX ORDER — CALCIUM CARBONATE 500 MG/1
2 TABLET, CHEWABLE ORAL 2 TIMES DAILY
Qty: 120 TABLET | Refills: 0 | Status: SHIPPED | OUTPATIENT
Start: 2025-02-20 | End: 2025-03-22

## 2025-02-20 RX ORDER — ACETAMINOPHEN 500 MG
1000 TABLET ORAL
Qty: 12 TABLET | Refills: 0 | Status: SHIPPED | OUTPATIENT
Start: 2025-02-20 | End: 2025-02-23

## 2025-02-20 RX ORDER — FENTANYL CITRATE 50 UG/ML
50 INJECTION, SOLUTION INTRAMUSCULAR; INTRAVENOUS
Status: DISCONTINUED | OUTPATIENT
Start: 2025-02-20 | End: 2025-02-20 | Stop reason: HOSPADM

## 2025-02-20 RX ORDER — ROCURONIUM BROMIDE 10 MG/ML
INJECTION, SOLUTION INTRAVENOUS AS NEEDED
Status: DISCONTINUED | OUTPATIENT
Start: 2025-02-20 | End: 2025-02-20 | Stop reason: SURG

## 2025-02-20 RX ORDER — LIDOCAINE HYDROCHLORIDE 10 MG/ML
0.5 INJECTION, SOLUTION INFILTRATION; PERINEURAL ONCE AS NEEDED
Status: DISCONTINUED | OUTPATIENT
Start: 2025-02-20 | End: 2025-02-20 | Stop reason: HOSPADM

## 2025-02-20 RX ORDER — PROMETHAZINE HYDROCHLORIDE 25 MG/1
25 SUPPOSITORY RECTAL ONCE AS NEEDED
Status: DISCONTINUED | OUTPATIENT
Start: 2025-02-20 | End: 2025-02-20 | Stop reason: HOSPADM

## 2025-02-20 RX ORDER — DEXAMETHASONE SODIUM PHOSPHATE 4 MG/ML
INJECTION, SOLUTION INTRA-ARTICULAR; INTRALESIONAL; INTRAMUSCULAR; INTRAVENOUS; SOFT TISSUE AS NEEDED
Status: DISCONTINUED | OUTPATIENT
Start: 2025-02-20 | End: 2025-02-20 | Stop reason: SURG

## 2025-02-20 RX ORDER — PROPOFOL 10 MG/ML
VIAL (ML) INTRAVENOUS AS NEEDED
Status: DISCONTINUED | OUTPATIENT
Start: 2025-02-20 | End: 2025-02-20 | Stop reason: SURG

## 2025-02-20 RX ORDER — PROMETHAZINE HYDROCHLORIDE 25 MG/1
25 TABLET ORAL ONCE AS NEEDED
Status: DISCONTINUED | OUTPATIENT
Start: 2025-02-20 | End: 2025-02-20 | Stop reason: HOSPADM

## 2025-02-20 RX ORDER — HYDRALAZINE HYDROCHLORIDE 20 MG/ML
5 INJECTION INTRAMUSCULAR; INTRAVENOUS
Status: DISCONTINUED | OUTPATIENT
Start: 2025-02-20 | End: 2025-02-20 | Stop reason: HOSPADM

## 2025-02-20 RX ORDER — FAMOTIDINE 10 MG/ML
20 INJECTION, SOLUTION INTRAVENOUS ONCE
Status: COMPLETED | OUTPATIENT
Start: 2025-02-20 | End: 2025-02-20

## 2025-02-20 RX ORDER — HYDROCODONE BITARTRATE AND ACETAMINOPHEN 5; 325 MG/1; MG/1
1 TABLET ORAL ONCE AS NEEDED
Status: DISCONTINUED | OUTPATIENT
Start: 2025-02-20 | End: 2025-02-20 | Stop reason: HOSPADM

## 2025-02-20 RX ORDER — LABETALOL HYDROCHLORIDE 5 MG/ML
5 INJECTION, SOLUTION INTRAVENOUS
Status: DISCONTINUED | OUTPATIENT
Start: 2025-02-20 | End: 2025-02-20 | Stop reason: HOSPADM

## 2025-02-20 RX ORDER — OXYCODONE AND ACETAMINOPHEN 7.5; 325 MG/1; MG/1
1 TABLET ORAL EVERY 4 HOURS PRN
Status: DISCONTINUED | OUTPATIENT
Start: 2025-02-20 | End: 2025-02-20 | Stop reason: HOSPADM

## 2025-02-20 RX ORDER — EPHEDRINE SULFATE 50 MG/ML
5 INJECTION, SOLUTION INTRAVENOUS ONCE AS NEEDED
Status: DISCONTINUED | OUTPATIENT
Start: 2025-02-20 | End: 2025-02-20 | Stop reason: HOSPADM

## 2025-02-20 RX ORDER — NALOXONE HCL 0.4 MG/ML
0.2 VIAL (ML) INJECTION AS NEEDED
Status: DISCONTINUED | OUTPATIENT
Start: 2025-02-20 | End: 2025-02-20 | Stop reason: HOSPADM

## 2025-02-20 RX ORDER — LEVOTHYROXINE SODIUM 25 UG/1
75 TABLET ORAL
Qty: 90 TABLET | Refills: 1 | Status: SHIPPED | OUTPATIENT
Start: 2025-02-20

## 2025-02-20 RX ORDER — MAGNESIUM HYDROXIDE 1200 MG/15ML
LIQUID ORAL AS NEEDED
Status: DISCONTINUED | OUTPATIENT
Start: 2025-02-20 | End: 2025-02-20 | Stop reason: HOSPADM

## 2025-02-20 RX ORDER — BUPIVACAINE HYDROCHLORIDE AND EPINEPHRINE 5; 5 MG/ML; UG/ML
INJECTION, SOLUTION EPIDURAL; INTRACAUDAL; PERINEURAL AS NEEDED
Status: DISCONTINUED | OUTPATIENT
Start: 2025-02-20 | End: 2025-02-20 | Stop reason: HOSPADM

## 2025-02-20 RX ORDER — SODIUM CHLORIDE 0.9 % (FLUSH) 0.9 %
1-10 SYRINGE (ML) INJECTION AS NEEDED
Status: DISCONTINUED | OUTPATIENT
Start: 2025-02-20 | End: 2025-02-20 | Stop reason: HOSPADM

## 2025-02-20 RX ADMIN — ONDANSETRON 4 MG: 2 INJECTION, SOLUTION INTRAMUSCULAR; INTRAVENOUS at 08:45

## 2025-02-20 RX ADMIN — SODIUM CHLORIDE, POTASSIUM CHLORIDE, SODIUM LACTATE AND CALCIUM CHLORIDE 9 ML/HR: 600; 310; 30; 20 INJECTION, SOLUTION INTRAVENOUS at 07:27

## 2025-02-20 RX ADMIN — FENTANYL CITRATE 50 MCG: 50 INJECTION, SOLUTION INTRAMUSCULAR; INTRAVENOUS at 08:36

## 2025-02-20 RX ADMIN — ONDANSETRON 4 MG: 2 INJECTION, SOLUTION INTRAMUSCULAR; INTRAVENOUS at 12:03

## 2025-02-20 RX ADMIN — TECHNETIUM TC 99M SESTAMIBI 1 DOSE: 1 INJECTION INTRAVENOUS at 07:09

## 2025-02-20 RX ADMIN — HYDROMORPHONE HYDROCHLORIDE 0.5 MG: 1 INJECTION, SOLUTION INTRAMUSCULAR; INTRAVENOUS; SUBCUTANEOUS at 12:04

## 2025-02-20 RX ADMIN — CEFAZOLIN 2000 MG: 2 INJECTION, POWDER, FOR SOLUTION INTRAMUSCULAR; INTRAVENOUS at 08:28

## 2025-02-20 RX ADMIN — ROCURONIUM BROMIDE 10 MG: 10 INJECTION, SOLUTION INTRAVENOUS at 08:38

## 2025-02-20 RX ADMIN — PROPOFOL 50 MG: 10 INJECTION, EMULSION INTRAVENOUS at 09:01

## 2025-02-20 RX ADMIN — OXYCODONE HYDROCHLORIDE 10 MG: 10 TABLET, FILM COATED, EXTENDED RELEASE ORAL at 06:33

## 2025-02-20 RX ADMIN — PROPOFOL 30 MG: 10 INJECTION, EMULSION INTRAVENOUS at 09:27

## 2025-02-20 RX ADMIN — DEXAMETHASONE SODIUM PHOSPHATE 8 MG: 4 INJECTION, SOLUTION INTRAMUSCULAR; INTRAVENOUS at 08:45

## 2025-02-20 RX ADMIN — PROPOFOL 150 MG: 10 INJECTION, EMULSION INTRAVENOUS at 08:38

## 2025-02-20 RX ADMIN — PROPOFOL 20 MG: 10 INJECTION, EMULSION INTRAVENOUS at 09:55

## 2025-02-20 RX ADMIN — PROPOFOL 50 MG: 10 INJECTION, EMULSION INTRAVENOUS at 08:39

## 2025-02-20 RX ADMIN — FAMOTIDINE 20 MG: 10 INJECTION INTRAVENOUS at 07:28

## 2025-02-20 RX ADMIN — ACETAMINOPHEN 1000 MG: 500 TABLET, FILM COATED ORAL at 06:33

## 2025-02-20 RX ADMIN — SCOPALAMINE 1 PATCH: 1 PATCH, EXTENDED RELEASE TRANSDERMAL at 12:09

## 2025-02-20 RX ADMIN — LIDOCAINE HYDROCHLORIDE 100 MG: 20 INJECTION, SOLUTION INFILTRATION; PERINEURAL at 08:38

## 2025-02-20 RX ADMIN — Medication 100 MG: at 08:40

## 2025-02-20 RX ADMIN — PROPOFOL 50 MG: 10 INJECTION, EMULSION INTRAVENOUS at 09:20

## 2025-02-20 NOTE — ANESTHESIA PROCEDURE NOTES
Airway  Urgency: elective    Date/Time: 2/20/2025 8:41 AM  Airway not difficult    General Information and Staff    Patient location during procedure: OR  CRNA/CAA: Joyce Walter CRNA    Indications and Patient Condition  Indications for airway management: airway protection    Preoxygenated: yes  MILS maintained throughout  Mask difficulty assessment: 1 - vent by mask    Final Airway Details  Final airway type: endotracheal airway      Successful airway: NIM tube and ETT  Cuffed: yes   Successful intubation technique: video laryngoscopy  Facilitating devices/methods: intubating stylet  Endotracheal tube insertion site: oral  Blade: CMAC  Blade size: D  ETT size (mm): 7.0  Cormack-Lehane Classification: grade I - full view of glottis  Placement verified by: chest auscultation and capnometry   Cuff volume (mL): 6  Measured from: lips  ETT/EBT  to lips (cm): 21  Number of attempts at approach: 1  Assessment: lips, teeth, and gum same as pre-op and atraumatic intubation

## 2025-02-20 NOTE — OP NOTE
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    Surgeon: Michael    Assistant: Angelica Brown    Indications/symptoms:  As above    Labs:  Pre op calcium: 9.1  Pre op intact PTH: 22.8  Pre op vit D: 29.1  Pre op calcitonin: <2.0  Pre op thyroglobulin:  17  Pre op TSH:  1.4    Findings:   Perioperative STAT PTH preoperatively 52.6  Perioperative STAT PTH post-resection 8.6  Gross findings:    Intrathyroidal right (minuscule with biopsy consuming) and left (implanted) inferior parathyroid  Left superior large, intact after completion  Pathology frozen:    - parathyroid and benign thyroid  Intraoperative nerve monitoring   - confirmed RLN and EBSL intact bilateral after completion    Recommendations:   Discharge today  2 TUMS bid and 0.25 micrograms calcitriol bid for 14 days.  Will likely wean thereafter.  30 day rx given  Intact PTH and calcium level at the hospital next week, TSH and T4  Levothyroxine 75 micrograms to start in 2 days.     Associated Issues:  Anxiety  GI issues    EBL: <50 ml    Technique:     General anesthetic was induced.  IV Kefzol was given.  The neck was extended, and then prepped with Hibiclens and draped sterilely.  The neck was examined and the skin creases evaluated to determine the best location for the incision, attempting to maximize both operative exposure and post op cosmesis.  I selected a low crease, below the clavicle, for cosmesis, and marked.      1/2% Marcaine with epinephrine was used to inject the site.  Incision was made thru the skin and  subcutaneous space and then completed with cautery to the cervical fascia.  Flaps were then raised with the facelift retractors and cautery, directly on the anterior aspect of the fascia, both superiorly and inferiorly.  The strap muscles were then divided in the midline.    Dissection was then begun under the strap muscle on the left side.  The bipolar on 15 was used.  I first divided the thin areolar tissue under the musculature, to free the thyroid anteriorly, then laterally, working directly on the muscle to avoid injury either to the thyroid or to the nerve or parathyroids.  I then used Allises to retract the thyroid, beginning at the superior pole, grasping that and pulling that out laterally so that I could begin dissection between the cricopharyngeus musculature and the thyroid, working upwards to identify the superior thyroid vasculature, and dissecting that free, while watching carefully to make sure that the parathyroid or vasculature to the parathyroid was not involved.   I stayed directly on the thyroid capsule to avoid injury to the external branch of the superior laryngeal nerve (EBSL).  The superior pole vascular was taken with clips and the harmonic yohannes.    The parathyroid was then sought as i rotated the tip of the superior pole down.  I did see this and pressed it back, it being at about the mid thyroid  The middle thyroid vein was dissected out and divided with the yohannes.  The thyroid was rotated medially and anteriorly, using the Allises.  General dissection towards the ligament of Harmon and in the paratracheal tissue was carried out but then I went to the inferior pole    The inferior pole was lifted and the fatty soft tissue surrounding it dissected free to avoid injury to the parathyroid.  I actually saw it in the thyroid itself being very tiny, dissected out and set it aside for reimplantation.  The vascular was taken down with harmonic yohannes and clip.    This allowed better access to  the peritracheal tissue where the nerve was sought and ultimately taking down after identifying the nerve.  The ligament of berry was so close on the nerves and actually divided it with a knife and then went back with the bipolar to take care of us a few small tiny bleeders.    The left inferior parathyroid was reimplanted into the left strap muscle.  Nerve function of both the external branch superior laryngeal and recurrent laryngeal nerve were identified and confirmed and the left superior parathyroid noted to be in very good condition.    I went to the right side and carried this out similarly.  On the right the inferior parathyroid appeared to be a little bigger, and so I biopsied it but then after I got the biopsy back there was essentially no parathyroid left.  The superior parathyroid on the right was felt to be identified in the surrounding thyroid tissue.  There was a nodule in the thyroid that really thought look like thyroid but we sent a just to make sure that was not parathyroid and it was not.  The superior and inferior pole were taken similarly.  The nerves were identified and confirmed after completion, to be functional.    Hemostasis was acquired via bipolar, estevan and surgicel.   The wound was then closed with a running 3-0 Vicryl to the midline strap muscles, followed by interrupted 3-0 Vicryl to the platysma, interrupted 3-0 Vicryl to the subcutaneous, and running 5-0 Vicryl to the skin.  Exofin was applied to the wound.    Sonia Rodriguez MD  02/20/25      Assistant was responsible for performing the following activities: Retraction, Suction, Suturing, Closing, and Placing Dressing and their skilled assistance was necessary for the success of this case.

## 2025-02-20 NOTE — ANESTHESIA POSTPROCEDURE EVALUATION
Patient: Shantel Welsh    Procedure Summary       Date: 02/20/25 Room / Location: [facility] OR [address] [place] [address] MAIN OR    Anesthesia Start: 0831 Anesthesia Stop: 1127    Procedure: THYROIDECTOMY AND REIMPLANTATION OF LEFT PARATHYROID (Neck) Diagnosis:       Multiple thyroid nodules      Hashimoto's thyroiditis      Disorder of vocal cord      (Multiple thyroid nodules [E04.2])      (Hashimoto's thyroiditis [E06.3])      (Disorder of vocal cord [J38.3])    Surgeons: Sonia Rodriguez MD Provider: Yvan Pradhan MD    Anesthesia Type: general ASA Status: 2            Anesthesia Type: general    Vitals  Vitals Value Taken Time   /81 02/20/25 1345   Temp 37.3 °C (99.2 °F) 02/20/25 1126   Pulse 64 02/20/25 1346   Resp 16 02/20/25 1330   SpO2 96 % 02/20/25 1346   Vitals shown include unfiled device data.        Post Anesthesia Care and Evaluation    Patient location during evaluation: bedside  Patient participation: complete - patient participated  Level of consciousness: awake and alert  Pain management: adequate    Airway patency: patent  Anesthetic complications: No anesthetic complications  PONV Status: controlled  Cardiovascular status: blood pressure returned to baseline and acceptable  Respiratory status: acceptable  Hydration status: acceptable
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
## 2025-02-20 NOTE — ANESTHESIA PREPROCEDURE EVALUATION
Anesthesia Evaluation     Patient summary reviewed and Nursing notes reviewed   NPO Solid Status: > 8 hours  NPO Liquid Status: > 2 hours           Airway   Mallampati: II  TM distance: >3 FB  Neck ROM: full  Dental      Pulmonary    (+) asthma (exercise induced),  Cardiovascular - negative cardio ROS        Neuro/Psych  (+) headaches, psychiatric history Depression  GI/Hepatic/Renal/Endo    (+) GERD, liver disease fatty liver disease, thyroid problem hypothyroidism and thyroid nodules    Musculoskeletal (-) negative ROS    Abdominal    Substance History - negative use     OB/GYN          Other - negative ROS                         Anesthesia Plan    ASA 2     general     intravenous induction     Anesthetic plan, risks, benefits, and alternatives have been provided, discussed and informed consent has been obtained with: patient.        CODE STATUS:

## 2025-02-25 ENCOUNTER — LAB (OUTPATIENT)
Facility: HOSPITAL | Age: 45
End: 2025-02-25
Payer: COMMERCIAL

## 2025-02-25 DIAGNOSIS — E55.9 VITAMIN D DEFICIENCY: ICD-10-CM

## 2025-02-25 DIAGNOSIS — E06.3 HASHIMOTO'S THYROIDITIS: ICD-10-CM

## 2025-02-25 DIAGNOSIS — E04.2 MULTIPLE THYROID NODULES: ICD-10-CM

## 2025-02-25 LAB
CALCIUM SPEC-SCNC: 9.2 MG/DL (ref 8.6–10.5)
CYTO UR: NORMAL
LAB AP CASE REPORT: NORMAL
Lab: NORMAL
PATH REPORT.FINAL DX SPEC: NORMAL
PATH REPORT.GROSS SPEC: NORMAL
PTH-INTACT SERPL-MCNC: 6.2 PG/ML (ref 15–65)
T4 FREE SERPL-MCNC: 1.76 NG/DL (ref 0.92–1.68)
TSH SERPL DL<=0.05 MIU/L-ACNC: 0.26 UIU/ML (ref 0.27–4.2)

## 2025-02-25 PROCEDURE — 36415 COLL VENOUS BLD VENIPUNCTURE: CPT

## 2025-02-25 PROCEDURE — 82310 ASSAY OF CALCIUM: CPT

## 2025-02-25 PROCEDURE — 83970 ASSAY OF PARATHORMONE: CPT

## 2025-02-25 PROCEDURE — 84443 ASSAY THYROID STIM HORMONE: CPT

## 2025-02-25 PROCEDURE — 84439 ASSAY OF FREE THYROXINE: CPT

## 2025-02-28 PROBLEM — E04.1 THYROID NODULE: Status: RESOLVED | Noted: 2024-12-17 | Resolved: 2025-02-28

## 2025-02-28 PROBLEM — E04.2 MULTIPLE THYROID NODULES: Status: RESOLVED | Noted: 2025-01-10 | Resolved: 2025-02-28

## 2025-02-28 NOTE — PROGRESS NOTES
SURGERY  Post op note    Shantel Welsh  1980  Today's date:  02/28/25    Shantel Welsh presents today after total thyroidectomy, left inferior parathyroid implantation into left strap muscles on 2/20/2025.  This was for a multinodular goiter with compressive symptoms, hypothyroidism with Hashimoto's, single nodule with Afirma test 4% risk of cancer, the patient having concern for that.  I actually tried to talk her out of going ahead with surgery, noting to her that her vocal cord nodules were not a result of her thyroid nodules and that surgery would increase her risk of being hoarse rather than improving it, and by traditional recommendation and standard of care, 4% risk of cancer in the thyroid routinely have a recommendation of observation.    Intraoperative findings were both in intrathyroidal right and left inferior parathyroid, the left which I was able to take a small portion and reimplant into the strap muscles, the right being very small with biopsy confirming it but essentially leaving no visible parathyroid behind the left superior parathyroid was intact after completion.      Pre op calcium and PTH were 9.1/22.8.  Post op discharge calcium and PTH were 9.2/6.2.  Perioperative STAT PTH was 52.6 pre op and 8.6 post op.  Postop TSH baseline was 0.258, free T4 1.76, consistent with hyperthyroidism.  A message was sent to Dr. Conroy.     Discharge medications were TUMS 2 tablets twice daily and calcitriol 0.25 micrograms twice daily, with recommendation for 14 days but 30-day Rx given.  In addition I started her on 75 mcg levothyroxine 2 days postop., which has been started.    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  There has been some numbness or tingling.      Incision looks good and there is no hoarseness.  Despite her tingling, she says that she feels much  "better, with tasting better, smelling better, breathing better, much better acid reflux, and some improvement of her nodules with improvement in her dysphonia.  She feels \"revved up\" tho with her heart racing, which she thinks is from her synthroid increase.  Her TSH after surgery with the synthroid increase was 0.258 (just a little low) and free T4 1.76 (just a little high).      Follow up 5/30/2025 scheduled with Dr Conroy.  So we will have her cut back to 50 micrograms daily of the levothyroxine now and recheck intact PTH, calcium and magnesium this week.     I'll see prn.       Sonia Rodriguez MD  "

## 2025-03-03 ENCOUNTER — OFFICE VISIT (OUTPATIENT)
Dept: SURGERY | Facility: CLINIC | Age: 45
End: 2025-03-03
Payer: COMMERCIAL

## 2025-03-03 ENCOUNTER — LAB (OUTPATIENT)
Dept: LAB | Facility: HOSPITAL | Age: 45
End: 2025-03-03
Payer: COMMERCIAL

## 2025-03-03 VITALS
HEIGHT: 71 IN | OXYGEN SATURATION: 99 % | DIASTOLIC BLOOD PRESSURE: 72 MMHG | HEART RATE: 66 BPM | BODY MASS INDEX: 20.97 KG/M2 | WEIGHT: 149.8 LBS | SYSTOLIC BLOOD PRESSURE: 110 MMHG

## 2025-03-03 DIAGNOSIS — E04.1 THYROID NODULE: Primary | ICD-10-CM

## 2025-03-03 DIAGNOSIS — E06.3 HASHIMOTO'S THYROIDITIS: ICD-10-CM

## 2025-03-03 DIAGNOSIS — E04.2 MULTIPLE THYROID NODULES: ICD-10-CM

## 2025-03-03 LAB
CALCIUM SPEC-SCNC: 9.7 MG/DL (ref 8.6–10.5)
MAGNESIUM SERPL-MCNC: 2.2 MG/DL (ref 1.6–2.6)
PTH-INTACT SERPL-MCNC: 8 PG/ML (ref 15–65)

## 2025-03-03 PROCEDURE — 82310 ASSAY OF CALCIUM: CPT

## 2025-03-03 PROCEDURE — 36415 COLL VENOUS BLD VENIPUNCTURE: CPT

## 2025-03-03 PROCEDURE — 1159F MED LIST DOCD IN RCRD: CPT | Performed by: SURGERY

## 2025-03-03 PROCEDURE — 99024 POSTOP FOLLOW-UP VISIT: CPT | Performed by: SURGERY

## 2025-03-03 PROCEDURE — 83970 ASSAY OF PARATHORMONE: CPT

## 2025-03-03 PROCEDURE — 1160F RVW MEDS BY RX/DR IN RCRD: CPT | Performed by: SURGERY

## 2025-03-03 PROCEDURE — 83735 ASSAY OF MAGNESIUM: CPT

## 2025-03-10 ENCOUNTER — PATIENT MESSAGE (OUTPATIENT)
Dept: ENDOCRINOLOGY | Age: 45
End: 2025-03-10
Payer: COMMERCIAL

## 2025-03-11 DIAGNOSIS — E04.2 MULTIPLE THYROID NODULES: Primary | ICD-10-CM

## 2025-03-11 RX ORDER — CALCIUM CARBONATE 500 MG/1
1 TABLET, CHEWABLE ORAL 2 TIMES DAILY
Status: SHIPPED
Start: 2025-03-11 | End: 2025-04-10

## 2025-03-11 RX ORDER — CALCITRIOL 0.25 UG/1
0.25 CAPSULE, LIQUID FILLED ORAL DAILY
Qty: 30 CAPSULE | Refills: 0 | Status: SHIPPED | OUTPATIENT
Start: 2025-03-11 | End: 2025-04-10

## 2025-03-13 RX ORDER — ERGOCALCIFEROL (VITAMIN D2) 10 MCG
400 TABLET ORAL DAILY
OUTPATIENT
Start: 2025-03-13

## 2025-03-18 ENCOUNTER — LAB (OUTPATIENT)
Dept: LAB | Facility: HOSPITAL | Age: 45
End: 2025-03-18
Payer: COMMERCIAL

## 2025-03-18 DIAGNOSIS — E04.2 MULTIPLE THYROID NODULES: ICD-10-CM

## 2025-03-18 LAB
CALCIUM SPEC-SCNC: 8.4 MG/DL (ref 8.6–10.5)
PTH-INTACT SERPL-MCNC: 23.8 PG/ML (ref 15–65)

## 2025-03-18 PROCEDURE — 36415 COLL VENOUS BLD VENIPUNCTURE: CPT

## 2025-03-18 PROCEDURE — 83970 ASSAY OF PARATHORMONE: CPT

## 2025-03-18 PROCEDURE — 82310 ASSAY OF CALCIUM: CPT

## 2025-03-19 ENCOUNTER — RESULTS FOLLOW-UP (OUTPATIENT)
Dept: SURGERY | Facility: CLINIC | Age: 45
End: 2025-03-19
Payer: COMMERCIAL

## 2025-03-19 DIAGNOSIS — E04.2 MULTIPLE THYROID NODULES: Primary | ICD-10-CM

## 2025-03-19 RX ORDER — CALCIUM CARBONATE 500 MG/1
2 TABLET, CHEWABLE ORAL 2 TIMES DAILY
Status: SHIPPED
Start: 2025-03-19 | End: 2025-04-18

## 2025-03-19 NOTE — PROGRESS NOTES
Let her know that her calcium is a little low, so she should increase to 2 TUMS bid and continue calcitriol until done.  Recheck labs in about 3 weeks.

## 2025-04-04 ENCOUNTER — TELEPHONE (OUTPATIENT)
Dept: SURGERY | Facility: CLINIC | Age: 45
End: 2025-04-04
Payer: COMMERCIAL

## 2025-04-04 NOTE — TELEPHONE ENCOUNTER
Patient called to report swelling above her incision s/p thyroid resection, total, left inferior parathyroid implantation left strap muscles 2/20/25. Pt has had these symptoms for the past week. Pt also reports difficulty swallowing and a sore throat. She also reports swelling under her jaw so and states that it hurts to look up. She feels like her muscles are fatigued. Pt states she has been trying to do vocal exercises but it hurts to use her voice. Instructed pt to send picture of the swelling via [employer].

## 2025-04-08 ENCOUNTER — LAB (OUTPATIENT)
Dept: LAB | Facility: HOSPITAL | Age: 45
End: 2025-04-08
Payer: COMMERCIAL

## 2025-04-08 ENCOUNTER — RESULTS FOLLOW-UP (OUTPATIENT)
Dept: SURGERY | Facility: CLINIC | Age: 45
End: 2025-04-08
Payer: COMMERCIAL

## 2025-04-08 DIAGNOSIS — E55.9 VITAMIN D DEFICIENCY: ICD-10-CM

## 2025-04-08 DIAGNOSIS — E04.2 MULTIPLE THYROID NODULES: ICD-10-CM

## 2025-04-08 DIAGNOSIS — E03.9 PRIMARY HYPOTHYROIDISM: Primary | ICD-10-CM

## 2025-04-08 LAB
CALCIUM SPEC-SCNC: 8.7 MG/DL (ref 8.6–10.5)
PTH-INTACT SERPL-MCNC: 24 PG/ML (ref 15–65)

## 2025-04-08 PROCEDURE — 83970 ASSAY OF PARATHORMONE: CPT

## 2025-04-08 PROCEDURE — 84443 ASSAY THYROID STIM HORMONE: CPT | Performed by: INTERNAL MEDICINE

## 2025-04-08 PROCEDURE — 84439 ASSAY OF FREE THYROXINE: CPT | Performed by: INTERNAL MEDICINE

## 2025-04-08 PROCEDURE — 82310 ASSAY OF CALCIUM: CPT

## 2025-04-08 PROCEDURE — 82306 VITAMIN D 25 HYDROXY: CPT | Performed by: INTERNAL MEDICINE

## 2025-04-08 PROCEDURE — 36415 COLL VENOUS BLD VENIPUNCTURE: CPT

## 2025-04-09 LAB
25(OH)D3 SERPL-MCNC: 30.8 NG/ML (ref 30–100)
T4 FREE SERPL-MCNC: 0.77 NG/DL (ref 0.92–1.68)
TSH SERPL DL<=0.05 MIU/L-ACNC: 48.9 UIU/ML (ref 0.27–4.2)

## 2025-04-13 ENCOUNTER — RESULTS FOLLOW-UP (OUTPATIENT)
Dept: ENDOCRINOLOGY | Age: 45
End: 2025-04-13
Payer: COMMERCIAL

## 2025-04-13 NOTE — PROGRESS NOTES
Free T4 low at 0.77 nanograms per DL.  TSH elevated at 48.9.  Thyroid levels are low on levothyroxine 75 mcg/day.  Increase levothyroxine to 100 mcg a day.  Prescription sent to [facility] pharmacy.  Recheck thyroid function test when she comes in for follow-up on May 30, 2025.  Copy of labs sent to FABRICIO Nieto NP and Dr. Rodriguez.  Please notify patient of results and instructions.

## 2025-04-14 RX ORDER — LEVOTHYROXINE SODIUM 100 UG/1
TABLET ORAL
Qty: 30 TABLET | Refills: 5 | Status: SHIPPED | OUTPATIENT
Start: 2025-04-14

## 2025-04-14 NOTE — PROGRESS NOTES
Increase levothyroxine to 100 mcg/day as discussed earlier.  Repeat free T4 and TSH in 6 weeks.  Please notify patient.

## 2025-04-14 NOTE — TELEPHONE ENCOUNTER
4/14 called and lm for pt to call reg his labs   Ok for hub to read to patient   Please schedule labs if needed or follow ups  Ok for  to read to patient   Please schedule labs if needed or follow ups

## 2025-05-08 ENCOUNTER — OFFICE VISIT (OUTPATIENT)
Dept: FAMILY MEDICINE CLINIC | Facility: CLINIC | Age: 45
End: 2025-05-08
Payer: COMMERCIAL

## 2025-05-08 VITALS
SYSTOLIC BLOOD PRESSURE: 90 MMHG | HEIGHT: 71 IN | DIASTOLIC BLOOD PRESSURE: 70 MMHG | TEMPERATURE: 98.9 F | WEIGHT: 144.2 LBS | OXYGEN SATURATION: 98 % | BODY MASS INDEX: 20.19 KG/M2 | HEART RATE: 75 BPM

## 2025-05-08 DIAGNOSIS — R53.83 FATIGUE, UNSPECIFIED TYPE: ICD-10-CM

## 2025-05-08 DIAGNOSIS — R59.1 LYMPHADENOPATHY: ICD-10-CM

## 2025-05-08 DIAGNOSIS — E55.9 VITAMIN D DEFICIENCY: ICD-10-CM

## 2025-05-08 DIAGNOSIS — R21 RASH: ICD-10-CM

## 2025-05-08 DIAGNOSIS — R59.1 LYMPHADENOPATHY: Primary | ICD-10-CM

## 2025-05-08 PROCEDURE — 1125F AMNT PAIN NOTED PAIN PRSNT: CPT

## 2025-05-08 PROCEDURE — 99214 OFFICE O/P EST MOD 30 MIN: CPT

## 2025-05-08 NOTE — PROGRESS NOTES
"Chief Complaint  Swollen Lymphnodes (Both sides of the neck, since February. Pt does state they are painful. )    Subjective        Shantel Welsh presents to [facility]    History of Present Illness  45 year old female presents for painful lynphadenopathy that began in February. Denies recent illness or allergy. Afebrile in clinic today and at home. She has intermittent rash that comes then clears on its own. Also experiencing fatigue with previous lab results showing Vitamin D deficiency. Reports metallic taste of tongue and burning with intake of acidic foods. She is currently on anti-inflammatory diet and eating small meals due to her gastritis.     Symptoms are: chronic.   Onset was 1 to 6 months.   Symptoms occur: constantly.  Symptoms include: abdominal pain, anorexia, joint pain, change in stool, nasal congestion, diaphoresis, fatigue, headaches, nausea, neck pain, rash, sore throat, swollen glands, vertigo and visual change.   Pertinent negative symptoms include no chest pain, no chills, no cough, no fever, no joint swelling, no myalgias, no numbness, no dysuria, no vomiting and no weakness.       Objective   Vital Signs:  BP 90/70 (BP Location: Left arm, Patient Position: Sitting, Cuff Size: Adult)   Pulse 75   Temp 98.9 °F (37.2 °C) (Temporal)   Ht 180.3 cm (70.98\")   Wt 65.4 kg (144 lb 3.2 oz)   SpO2 98%   BMI 20.12 kg/m²   Estimated body mass index is 20.12 kg/m² as calculated from the following:    Height as of this encounter: 180.3 cm (70.98\").    Weight as of this encounter: 65.4 kg (144 lb 3.2 oz).    BMI is within normal parameters. No other follow-up for BMI required.      Physical Exam  Constitutional:       Appearance: Normal appearance.   HENT:      Head: Normocephalic.   Eyes:      Conjunctiva/sclera: Conjunctivae normal.      Pupils: Pupils are equal, round, and reactive to light.   Cardiovascular:      Rate and Rhythm: Normal rate and regular rhythm.      " Pulses: Normal pulses.      Heart sounds: Normal heart sounds.   Pulmonary:      Effort: Pulmonary effort is normal.      Breath sounds: Normal breath sounds.   Skin:     General: Skin is warm.   Neurological:      General: No focal deficit present.      Mental Status: She is alert and oriented to person, place, and time.   Psychiatric:         Mood and Affect: Mood normal.         Behavior: Behavior normal.        Result Review :                Assessment and Plan   Diagnoses and all orders for this visit:    1. Lymphadenopathy (Primary)  -     US Head Neck Soft Tissue; Future  -     Cancel: CBC w AUTO Differential; Future  -     Cancel: CATHLEEN; Future  -     Cancel: C-reactive protein; Future  -     Cancel: Sedimentation Rate; Future  -     Cancel: Mononucleosis Screen; Future  -     Mononucleosis Screen; Future  -     Sedimentation Rate; Future  -     C-reactive protein; Future  -     CATHLEEN; Future  -     CBC w AUTO Differential; Future    2. Rash  -     Cancel: CBC w AUTO Differential; Future  -     Cancel: CATHLEEN; Future  -     Cancel: C-reactive protein; Future  -     Cancel: Sedimentation Rate; Future  -     Cancel: Mononucleosis Screen; Future  -     Mononucleosis Screen; Future  -     Sedimentation Rate; Future  -     C-reactive protein; Future  -     CATHLEEN; Future  -     CBC w AUTO Differential; Future    3. Vitamin D deficiency  -     Cancel: Vitamin D 25 hydroxy; Future  -     Vitamin D 25 hydroxy; Future    4. Fatigue, unspecified type  -     Cancel: Vitamin D 25 hydroxy; Future  -     Cancel: Iron and TIBC; Future  -     Cancel: Ferritin; Future  -     Cancel: Vitamin B12 & Folate; Future  -     Cancel: Mononucleosis Screen; Future  -     Mononucleosis Screen; Future  -     Vitamin B12 & Folate; Future  -     Ferritin; Future  -     Iron and TIBC; Future  -     Vitamin D 25 hydroxy; Future             Follow Up   No follow-ups on file.  Patient was given instructions and counseling regarding her condition or for  health maintenance advice. Please see specific information pulled into the AVS if appropriate.

## 2025-05-12 LAB
25(OH)D3+25(OH)D2 SERPL-MCNC: 30.3 NG/ML (ref 30–100)
ANA SER QL: NEGATIVE
BASOPHILS # BLD AUTO: 0.04 10*3/MM3 (ref 0–0.2)
BASOPHILS NFR BLD AUTO: 0.9 % (ref 0–1.5)
CRP SERPL-MCNC: <0.3 MG/DL (ref 0–0.5)
EOSINOPHIL # BLD AUTO: 0.18 10*3/MM3 (ref 0–0.4)
EOSINOPHIL NFR BLD AUTO: 3.9 % (ref 0.3–6.2)
ERYTHROCYTE [DISTWIDTH] IN BLOOD BY AUTOMATED COUNT: 13.2 % (ref 12.3–15.4)
ERYTHROCYTE [SEDIMENTATION RATE] IN BLOOD BY WESTERGREN METHOD: <1 MM/HR (ref 0–20)
FERRITIN SERPL-MCNC: 49.6 NG/ML (ref 13–150)
FOLATE SERPL-MCNC: 14.8 NG/ML (ref 4.78–24.2)
HCT VFR BLD AUTO: 39.9 % (ref 34–46.6)
HETEROPH AB SER QL LA: NEGATIVE
HGB BLD-MCNC: 13.2 G/DL (ref 12–15.9)
IMM GRANULOCYTES # BLD AUTO: 0.01 10*3/MM3 (ref 0–0.05)
IMM GRANULOCYTES NFR BLD AUTO: 0.2 % (ref 0–0.5)
IRON SATN MFR SERPL: 20 % (ref 20–50)
IRON SERPL-MCNC: 69 MCG/DL (ref 37–145)
LYMPHOCYTES # BLD AUTO: 1.57 10*3/MM3 (ref 0.7–3.1)
LYMPHOCYTES NFR BLD AUTO: 34.1 % (ref 19.6–45.3)
MCH RBC QN AUTO: 32.3 PG (ref 26.6–33)
MCHC RBC AUTO-ENTMCNC: 33.1 G/DL (ref 31.5–35.7)
MCV RBC AUTO: 97.6 FL (ref 79–97)
MONOCYTES # BLD AUTO: 0.31 10*3/MM3 (ref 0.1–0.9)
MONOCYTES NFR BLD AUTO: 6.7 % (ref 5–12)
NEUTROPHILS # BLD AUTO: 2.5 10*3/MM3 (ref 1.7–7)
NEUTROPHILS NFR BLD AUTO: 54.2 % (ref 42.7–76)
NRBC BLD AUTO-RTO: 0 /100 WBC (ref 0–0.2)
PLATELET # BLD AUTO: 183 10*3/MM3 (ref 140–450)
RBC # BLD AUTO: 4.09 10*6/MM3 (ref 3.77–5.28)
TIBC SERPL-MCNC: 346 MCG/DL
UIBC SERPL-MCNC: 277 MCG/DL (ref 112–346)
VIT B12 SERPL-MCNC: 457 PG/ML (ref 211–946)
WBC # BLD AUTO: 4.61 10*3/MM3 (ref 3.4–10.8)

## 2025-05-15 ENCOUNTER — RESULTS FOLLOW-UP (OUTPATIENT)
Dept: FAMILY MEDICINE CLINIC | Facility: CLINIC | Age: 45
End: 2025-05-15
Payer: COMMERCIAL

## 2025-05-20 ENCOUNTER — HOSPITAL ENCOUNTER (OUTPATIENT)
Facility: HOSPITAL | Age: 45
Discharge: HOME | End: 2025-05-20
Payer: COMMERCIAL

## 2025-05-20 DIAGNOSIS — R59.1 LYMPHADENOPATHY: ICD-10-CM

## 2025-05-20 PROCEDURE — 76536 US EXAM OF HEAD AND NECK: CPT

## 2025-05-21 ENCOUNTER — PATIENT MESSAGE (OUTPATIENT)
Dept: ENDOCRINOLOGY | Age: 45
End: 2025-05-21
Payer: COMMERCIAL

## 2025-05-22 ENCOUNTER — LAB (OUTPATIENT)
Facility: HOSPITAL | Age: 45
End: 2025-05-22
Payer: COMMERCIAL

## 2025-05-22 DIAGNOSIS — E89.0 POSTSURGICAL HYPOTHYROIDISM: ICD-10-CM

## 2025-05-22 DIAGNOSIS — E55.9 VITAMIN D DEFICIENCY: Primary | ICD-10-CM

## 2025-05-22 DIAGNOSIS — E55.9 VITAMIN D DEFICIENCY: ICD-10-CM

## 2025-05-22 LAB
25(OH)D3 SERPL-MCNC: 32.7 NG/ML (ref 30–100)
ALBUMIN SERPL-MCNC: 4.6 G/DL (ref 3.5–5.2)
ALBUMIN/GLOB SERPL: 1.9 G/DL
ALP SERPL-CCNC: 39 U/L (ref 39–117)
ALT SERPL W P-5'-P-CCNC: 9 U/L (ref 1–33)
ANION GAP SERPL CALCULATED.3IONS-SCNC: 11 MMOL/L (ref 5–15)
AST SERPL-CCNC: 16 U/L (ref 1–32)
BILIRUB SERPL-MCNC: 1.1 MG/DL (ref 0–1.2)
BUN SERPL-MCNC: 9 MG/DL (ref 6–20)
BUN/CREAT SERPL: 11.4 (ref 7–25)
CA-I SERPL ISE-MCNC: 1.2 MMOL/L (ref 1.15–1.35)
CALCIUM SPEC-SCNC: 9 MG/DL (ref 8.6–10.5)
CHLORIDE SERPL-SCNC: 104 MMOL/L (ref 98–107)
CO2 SERPL-SCNC: 24 MMOL/L (ref 22–29)
CREAT SERPL-MCNC: 0.79 MG/DL (ref 0.57–1)
EGFRCR SERPLBLD CKD-EPI 2021: 95.3 ML/MIN/1.73
GLOBULIN UR ELPH-MCNC: 2.4 GM/DL
GLUCOSE SERPL-MCNC: 97 MG/DL (ref 65–99)
POTASSIUM SERPL-SCNC: 3.9 MMOL/L (ref 3.5–5.2)
PROT SERPL-MCNC: 7 G/DL (ref 6–8.5)
PTH-INTACT SERPL-MCNC: 27.3 PG/ML (ref 15–65)
SODIUM SERPL-SCNC: 139 MMOL/L (ref 136–145)
T4 FREE SERPL-MCNC: 1.22 NG/DL (ref 0.92–1.68)
TSH SERPL DL<=0.05 MIU/L-ACNC: 10.7 UIU/ML (ref 0.27–4.2)

## 2025-05-22 PROCEDURE — 80053 COMPREHEN METABOLIC PANEL: CPT

## 2025-05-22 PROCEDURE — 36415 COLL VENOUS BLD VENIPUNCTURE: CPT

## 2025-05-22 PROCEDURE — 82330 ASSAY OF CALCIUM: CPT

## 2025-05-22 PROCEDURE — 83970 ASSAY OF PARATHORMONE: CPT

## 2025-05-22 PROCEDURE — 82306 VITAMIN D 25 HYDROXY: CPT

## 2025-05-22 PROCEDURE — 84439 ASSAY OF FREE THYROXINE: CPT

## 2025-05-22 PROCEDURE — 84443 ASSAY THYROID STIM HORMONE: CPT

## 2025-05-30 ENCOUNTER — TELEPHONE (OUTPATIENT)
Dept: FAMILY MEDICINE CLINIC | Facility: CLINIC | Age: 45
End: 2025-05-30

## 2025-05-30 ENCOUNTER — OFFICE VISIT (OUTPATIENT)
Dept: ENDOCRINOLOGY | Age: 45
End: 2025-05-30
Payer: COMMERCIAL

## 2025-05-30 VITALS
OXYGEN SATURATION: 100 % | DIASTOLIC BLOOD PRESSURE: 68 MMHG | HEART RATE: 59 BPM | HEIGHT: 71 IN | SYSTOLIC BLOOD PRESSURE: 112 MMHG | WEIGHT: 143.2 LBS | TEMPERATURE: 97.7 F | BODY MASS INDEX: 20.05 KG/M2

## 2025-05-30 DIAGNOSIS — K21.9 GASTROESOPHAGEAL REFLUX DISEASE, UNSPECIFIED WHETHER ESOPHAGITIS PRESENT: ICD-10-CM

## 2025-05-30 DIAGNOSIS — Z87.19 HISTORY OF GASTRITIS: ICD-10-CM

## 2025-05-30 DIAGNOSIS — E89.0 POSTSURGICAL HYPOTHYROIDISM: Primary | ICD-10-CM

## 2025-05-30 PROCEDURE — 99214 OFFICE O/P EST MOD 30 MIN: CPT | Performed by: INTERNAL MEDICINE

## 2025-05-30 RX ORDER — LEVOTHYROXINE SODIUM 125 UG/1
125 TABLET ORAL
Qty: 90 TABLET | Refills: 1 | Status: SHIPPED | OUTPATIENT
Start: 2025-05-30

## 2025-05-30 NOTE — TELEPHONE ENCOUNTER
Caller: Shantel Welsh    Relationship: Self    Best call back number: [phone]       Additional notes: PATIENT WOULD LIKE TO MOVE FORWARD WITH THE ORDERS FOR THE CT SCAN. PLEASE CALL TO FOLLOW UP ONCE ORDERS ARE PLACED
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
## 2025-06-01 DIAGNOSIS — R59.0 ENLARGED LYMPH NODE IN NECK: Primary | ICD-10-CM

## 2025-06-11 ENCOUNTER — HOSPITAL ENCOUNTER (OUTPATIENT)
Dept: CT IMAGING | Facility: HOSPITAL | Age: 45
Discharge: HOME | End: 2025-06-11
Payer: COMMERCIAL

## 2025-06-11 DIAGNOSIS — R59.0 ENLARGED LYMPH NODE IN NECK: ICD-10-CM

## 2025-06-11 PROCEDURE — 25510000001 IOPAMIDOL 61 % SOLUTION

## 2025-06-11 PROCEDURE — 70491 CT SOFT TISSUE NECK W/DYE: CPT

## 2025-06-11 RX ORDER — IOPAMIDOL 612 MG/ML
100 INJECTION, SOLUTION INTRAVASCULAR
Status: COMPLETED | OUTPATIENT
Start: 2025-06-11 | End: 2025-06-11

## 2025-06-11 RX ADMIN — IOPAMIDOL 75 ML: 612 INJECTION, SOLUTION INTRAVENOUS at 17:27

## 2025-06-13 ENCOUNTER — RESULTS FOLLOW-UP (OUTPATIENT)
Dept: FAMILY MEDICINE CLINIC | Facility: CLINIC | Age: 45
End: 2025-06-13
Payer: COMMERCIAL

## 2025-06-13 DIAGNOSIS — R59.1 LYMPHADENOPATHY: ICD-10-CM

## 2025-06-13 DIAGNOSIS — R59.0 ENLARGED LYMPH NODE IN NECK: Primary | ICD-10-CM

## 2025-06-13 DIAGNOSIS — R59.1 LYMPHADENOPATHY: Primary | ICD-10-CM

## 2025-06-13 RX ORDER — AZITHROMYCIN 250 MG/1
TABLET, FILM COATED ORAL
Qty: 6 TABLET | Refills: 0 | Status: SHIPPED | OUTPATIENT
Start: 2025-06-13

## 2025-07-01 ENCOUNTER — TELEPHONE (OUTPATIENT)
Dept: ENDOCRINOLOGY | Age: 45
End: 2025-07-01
Payer: COMMERCIAL

## 2025-07-01 NOTE — TELEPHONE ENCOUNTER
7/1 pt called in stating since you took her off the brand name synthroid she has been having more nausea, muscle cramps and her cycles have been running for 18 days   No Vaccines Administered.

## 2025-07-01 NOTE — TELEPHONE ENCOUNTER
Continue levothyroxine 125 mcg/day.  Please instruct patient to get follow-up labs next week.  Orders were placed on chart.  She has chronic gastritis and is on Pepcid..  Please have her call Dr. Brady about her GI symptoms.  Please have her see her gynecologist for prolonged menstrual bleeding.

## 2025-07-02 NOTE — TELEPHONE ENCOUNTER
7/2 called and sw pt told her to come in next week for labs / [facility] / told her to follow up with dr Brady and her GYN

## 2025-07-09 ENCOUNTER — TELEPHONE (OUTPATIENT)
Dept: GASTROENTEROLOGY | Facility: CLINIC | Age: 45
End: 2025-07-09
Payer: COMMERCIAL

## 2025-07-09 ENCOUNTER — LAB (OUTPATIENT)
Dept: LAB | Facility: HOSPITAL | Age: 45
End: 2025-07-09
Payer: COMMERCIAL

## 2025-07-09 DIAGNOSIS — E89.0 POSTSURGICAL HYPOTHYROIDISM: ICD-10-CM

## 2025-07-09 LAB
25(OH)D3 SERPL-MCNC: 28.1 NG/ML (ref 30–100)
ALBUMIN SERPL-MCNC: 4.3 G/DL (ref 3.5–5.2)
ALBUMIN/GLOB SERPL: 1.8 G/DL
ALP SERPL-CCNC: 37 U/L (ref 39–117)
ALT SERPL W P-5'-P-CCNC: 11 U/L (ref 1–33)
ANION GAP SERPL CALCULATED.3IONS-SCNC: 9.1 MMOL/L (ref 5–15)
AST SERPL-CCNC: 16 U/L (ref 1–32)
BILIRUB SERPL-MCNC: 1.4 MG/DL (ref 0–1.2)
BUN SERPL-MCNC: 10 MG/DL (ref 6–20)
BUN/CREAT SERPL: 12.3 (ref 7–25)
CA-I SERPL ISE-MCNC: 1.17 MMOL/L (ref 1.15–1.35)
CALCIUM SPEC-SCNC: 8.6 MG/DL (ref 8.6–10.5)
CHLORIDE SERPL-SCNC: 108 MMOL/L (ref 98–107)
CO2 SERPL-SCNC: 24.9 MMOL/L (ref 22–29)
CREAT SERPL-MCNC: 0.81 MG/DL (ref 0.57–1)
EGFRCR SERPLBLD CKD-EPI 2021: 92.5 ML/MIN/1.73
GLOBULIN UR ELPH-MCNC: 2.4 GM/DL
GLUCOSE SERPL-MCNC: 90 MG/DL (ref 65–99)
POTASSIUM SERPL-SCNC: 3.8 MMOL/L (ref 3.5–5.2)
PROT SERPL-MCNC: 6.7 G/DL (ref 6–8.5)
SODIUM SERPL-SCNC: 142 MMOL/L (ref 136–145)
T4 FREE SERPL-MCNC: 1.7 NG/DL (ref 0.92–1.68)
TSH SERPL DL<=0.05 MIU/L-ACNC: 4.46 UIU/ML (ref 0.27–4.2)

## 2025-07-09 PROCEDURE — 82306 VITAMIN D 25 HYDROXY: CPT

## 2025-07-09 PROCEDURE — 82330 ASSAY OF CALCIUM: CPT

## 2025-07-09 PROCEDURE — 84439 ASSAY OF FREE THYROXINE: CPT

## 2025-07-09 PROCEDURE — 36415 COLL VENOUS BLD VENIPUNCTURE: CPT

## 2025-07-09 PROCEDURE — 84443 ASSAY THYROID STIM HORMONE: CPT

## 2025-07-09 PROCEDURE — 80053 COMPREHEN METABOLIC PANEL: CPT

## 2025-07-09 NOTE — TELEPHONE ENCOUNTER
Ok for the hub to relay and schedule.  Left vm for the pt to call back and schedule a follow up appt with Myla Goldman.

## 2025-07-10 ENCOUNTER — PATIENT MESSAGE (OUTPATIENT)
Dept: ENDOCRINOLOGY | Age: 45
End: 2025-07-10
Payer: COMMERCIAL

## 2025-07-10 DIAGNOSIS — E89.0 POSTSURGICAL HYPOTHYROIDISM: Primary | ICD-10-CM

## 2025-07-10 RX ORDER — LEVOTHYROXINE SODIUM 125 MCG
125 TABLET ORAL
Qty: 90 TABLET | Refills: 1 | Status: SHIPPED | OUTPATIENT
Start: 2025-07-10

## 2025-07-14 DIAGNOSIS — K11.1 ENLARGEMENT OF SUBMANDIBULAR GLAND: Primary | ICD-10-CM

## 2025-07-14 DIAGNOSIS — K11.7 XEROSTOMIA: ICD-10-CM

## 2025-07-15 ENCOUNTER — RESULTS FOLLOW-UP (OUTPATIENT)
Dept: ENDOCRINOLOGY | Age: 45
End: 2025-07-15
Payer: COMMERCIAL

## 2025-07-15 DIAGNOSIS — E89.0 POSTSURGICAL HYPOTHYROIDISM: Primary | ICD-10-CM

## 2025-07-15 NOTE — PROGRESS NOTES
25-hydroxy vitamin D 28.1 ng/mL.  Continue multivitamins with vitamin D 1 tablet daily.  Normal serum calcium.  Normal ionized calcium.  TSH slightly elevated at 4.46.  Free T4 1.7 ng per DL.  Continue levothyroxine 125 mcg/day.  Repeat free T4 and TSH in 6 weeks.  Orders placed on chart.  Copy of labs sent to FABRICIO Nieto NP.  Please notify patient of results and instructions.

## 2025-07-16 NOTE — TELEPHONE ENCOUNTER
7/16 called and lm for pt to call reg her labs   Ok for hub to read to patient   Please schedule labs if needed or follow ups  Ok for  to read to patient   Please schedule labs if needed or follow ups

## 2025-07-22 ENCOUNTER — TELEPHONE (OUTPATIENT)
Dept: ENDOCRINOLOGY | Age: 45
End: 2025-07-22

## 2025-07-23 DIAGNOSIS — E89.0 POSTSURGICAL HYPOTHYROIDISM: ICD-10-CM

## 2025-07-23 RX ORDER — LEVOTHYROXINE SODIUM 125 MCG
125 TABLET ORAL
Qty: 30 TABLET | Refills: 2 | Status: SHIPPED | OUTPATIENT
Start: 2025-07-23

## 2025-08-09 ENCOUNTER — APPOINTMENT (OUTPATIENT)
Dept: GENERAL RADIOLOGY | Facility: HOSPITAL | Age: 45
End: 2025-08-09
Payer: OTHER GOVERNMENT

## 2025-08-09 ENCOUNTER — HOSPITAL ENCOUNTER (EMERGENCY)
Facility: HOSPITAL | Age: 45
Discharge: HOME | End: 2025-08-09
Attending: EMERGENCY MEDICINE
Payer: OTHER GOVERNMENT

## 2025-08-09 ENCOUNTER — APPOINTMENT (OUTPATIENT)
Dept: CT IMAGING | Facility: HOSPITAL | Age: 45
End: 2025-08-09
Payer: OTHER GOVERNMENT

## 2025-08-09 VITALS
OXYGEN SATURATION: 100 % | SYSTOLIC BLOOD PRESSURE: 121 MMHG | RESPIRATION RATE: 18 BRPM | DIASTOLIC BLOOD PRESSURE: 87 MMHG | HEART RATE: 76 BPM | TEMPERATURE: 98.7 F

## 2025-08-09 DIAGNOSIS — R31.9 HEMATURIA, UNSPECIFIED TYPE: ICD-10-CM

## 2025-08-09 DIAGNOSIS — R11.0 NAUSEA: ICD-10-CM

## 2025-08-09 DIAGNOSIS — R42 LIGHT HEADED: ICD-10-CM

## 2025-08-09 DIAGNOSIS — R10.9 RIGHT SIDED ABDOMINAL PAIN: Primary | ICD-10-CM

## 2025-08-09 LAB
ALBUMIN SERPL-MCNC: 4.2 G/DL (ref 3.5–5.2)
ALBUMIN/GLOB SERPL: 2.2 G/DL
ALP SERPL-CCNC: 37 U/L (ref 39–117)
ALT SERPL W P-5'-P-CCNC: 9 U/L (ref 1–33)
ANION GAP SERPL CALCULATED.3IONS-SCNC: 11 MMOL/L (ref 5–15)
AST SERPL-CCNC: 17 U/L (ref 1–32)
BASOPHILS # BLD AUTO: 0.04 10*3/MM3 (ref 0–0.2)
BASOPHILS NFR BLD AUTO: 0.4 % (ref 0–1.5)
BILIRUB SERPL-MCNC: 0.7 MG/DL (ref 0–1.2)
BUN SERPL-MCNC: 8 MG/DL (ref 6–20)
BUN/CREAT SERPL: 11.4 (ref 7–25)
CALCIUM SPEC-SCNC: 7.8 MG/DL (ref 8.6–10.5)
CHLORIDE SERPL-SCNC: 106 MMOL/L (ref 98–107)
CO2 SERPL-SCNC: 23 MMOL/L (ref 22–29)
CREAT SERPL-MCNC: 0.7 MG/DL (ref 0.57–1)
DEPRECATED RDW RBC AUTO: 44.6 FL (ref 37–54)
EGFRCR SERPLBLD CKD-EPI 2021: 110.2 ML/MIN/1.73
EOSINOPHIL # BLD AUTO: 0.04 10*3/MM3 (ref 0–0.4)
EOSINOPHIL NFR BLD AUTO: 0.4 % (ref 0.3–6.2)
ERYTHROCYTE [DISTWIDTH] IN BLOOD BY AUTOMATED COUNT: 11.9 % (ref 12.3–15.4)
GLOBULIN UR ELPH-MCNC: 1.9 GM/DL
GLUCOSE SERPL-MCNC: 88 MG/DL (ref 65–99)
HCG SERPL QL: NEGATIVE
HCT VFR BLD AUTO: 37.2 % (ref 34–46.6)
HGB BLD-MCNC: 12.6 G/DL (ref 12–15.9)
IMM GRANULOCYTES # BLD AUTO: 0.03 10*3/MM3 (ref 0–0.05)
IMM GRANULOCYTES NFR BLD AUTO: 0.3 % (ref 0–0.5)
LIPASE SERPL-CCNC: 33 U/L (ref 13–60)
LYMPHOCYTES # BLD AUTO: 1.24 10*3/MM3 (ref 0.7–3.1)
LYMPHOCYTES NFR BLD AUTO: 12.9 % (ref 19.6–45.3)
MAGNESIUM SERPL-MCNC: 2.1 MG/DL (ref 1.6–2.6)
MCH RBC QN AUTO: 34.1 PG (ref 26.6–33)
MCHC RBC AUTO-ENTMCNC: 33.9 G/DL (ref 31.5–35.7)
MCV RBC AUTO: 100.5 FL (ref 79–97)
MONOCYTES # BLD AUTO: 0.43 10*3/MM3 (ref 0.1–0.9)
MONOCYTES NFR BLD AUTO: 4.5 % (ref 5–12)
NEUTROPHILS NFR BLD AUTO: 7.81 10*3/MM3 (ref 1.7–7)
NEUTROPHILS NFR BLD AUTO: 81.5 % (ref 42.7–76)
NRBC BLD AUTO-RTO: 0 /100 WBC (ref 0–0.2)
NT-PROBNP SERPL-MCNC: 48.5 PG/ML (ref 0–450)
PLATELET # BLD AUTO: 208 10*3/MM3 (ref 140–450)
PMV BLD AUTO: 10.1 FL (ref 6–12)
POTASSIUM SERPL-SCNC: 3.5 MMOL/L (ref 3.5–5.2)
PROT SERPL-MCNC: 6.1 G/DL (ref 6–8.5)
RBC # BLD AUTO: 3.7 10*6/MM3 (ref 3.77–5.28)
SODIUM SERPL-SCNC: 140 MMOL/L (ref 136–145)
TROPONIN T SERPL HS-MCNC: <6 NG/L
TSH SERPL DL<=0.05 MIU/L-ACNC: 1.14 UIU/ML (ref 0.27–4.2)
WBC NRBC COR # BLD AUTO: 9.59 10*3/MM3 (ref 3.4–10.8)

## 2025-08-09 PROCEDURE — 71045 X-RAY EXAM CHEST 1 VIEW: CPT

## 2025-08-09 PROCEDURE — 83690 ASSAY OF LIPASE: CPT | Performed by: EMERGENCY MEDICINE

## 2025-08-09 PROCEDURE — 83735 ASSAY OF MAGNESIUM: CPT | Performed by: EMERGENCY MEDICINE

## 2025-08-09 PROCEDURE — 84703 CHORIONIC GONADOTROPIN ASSAY: CPT | Performed by: EMERGENCY MEDICINE

## 2025-08-09 PROCEDURE — 93005 ELECTROCARDIOGRAM TRACING: CPT | Performed by: EMERGENCY MEDICINE

## 2025-08-09 PROCEDURE — 96374 THER/PROPH/DIAG INJ IV PUSH: CPT

## 2025-08-09 PROCEDURE — 25010000002 DROPERIDOL PER 5 MG: Performed by: EMERGENCY MEDICINE

## 2025-08-09 PROCEDURE — 25810000003 SODIUM CHLORIDE 0.9 % SOLUTION: Performed by: EMERGENCY MEDICINE

## 2025-08-09 PROCEDURE — 96375 TX/PRO/DX INJ NEW DRUG ADDON: CPT

## 2025-08-09 PROCEDURE — 96361 HYDRATE IV INFUSION ADD-ON: CPT

## 2025-08-09 PROCEDURE — 80050 GENERAL HEALTH PANEL: CPT | Performed by: EMERGENCY MEDICINE

## 2025-08-09 PROCEDURE — 99284 EMERGENCY DEPT VISIT MOD MDM: CPT

## 2025-08-09 PROCEDURE — 83880 ASSAY OF NATRIURETIC PEPTIDE: CPT | Performed by: EMERGENCY MEDICINE

## 2025-08-09 PROCEDURE — 25010000002 KETOROLAC TROMETHAMINE PER 15 MG: Performed by: EMERGENCY MEDICINE

## 2025-08-09 PROCEDURE — 84484 ASSAY OF TROPONIN QUANT: CPT | Performed by: EMERGENCY MEDICINE

## 2025-08-09 RX ORDER — DROPERIDOL 2.5 MG/ML
1.25 INJECTION, SOLUTION INTRAMUSCULAR; INTRAVENOUS ONCE
Status: COMPLETED | OUTPATIENT
Start: 2025-08-09 | End: 2025-08-09

## 2025-08-09 RX ORDER — KETOROLAC TROMETHAMINE 15 MG/ML
15 INJECTION, SOLUTION INTRAMUSCULAR; INTRAVENOUS ONCE
Status: COMPLETED | OUTPATIENT
Start: 2025-08-09 | End: 2025-08-09

## 2025-08-09 RX ADMIN — SODIUM CHLORIDE 1000 ML: 9 INJECTION, SOLUTION INTRAVENOUS at 18:25

## 2025-08-09 RX ADMIN — KETOROLAC TROMETHAMINE 15 MG: 15 INJECTION, SOLUTION INTRAMUSCULAR; INTRAVENOUS at 18:22

## 2025-08-09 RX ADMIN — DROPERIDOL 1.25 MG: 2.5 INJECTION, SOLUTION INTRAMUSCULAR; INTRAVENOUS at 18:22

## 2025-08-10 ENCOUNTER — PATIENT MESSAGE (OUTPATIENT)
Dept: ENDOCRINOLOGY | Age: 45
End: 2025-08-10
Payer: COMMERCIAL

## 2025-08-10 LAB
QT INTERVAL: 413 MS
QTC INTERVAL: 465 MS

## 2025-08-11 DIAGNOSIS — E55.9 VITAMIN D DEFICIENCY: ICD-10-CM

## 2025-08-11 DIAGNOSIS — E83.51 HYPOCALCEMIA: ICD-10-CM

## 2025-08-11 DIAGNOSIS — E89.0 POSTSURGICAL HYPOTHYROIDISM: Primary | ICD-10-CM

## 2025-08-14 ENCOUNTER — RESULTS FOLLOW-UP (OUTPATIENT)
Dept: ENDOCRINOLOGY | Age: 45
End: 2025-08-14
Payer: COMMERCIAL

## 2025-08-14 ENCOUNTER — LAB (OUTPATIENT)
Facility: HOSPITAL | Age: 45
End: 2025-08-14
Payer: COMMERCIAL

## 2025-08-14 DIAGNOSIS — E89.0 POSTSURGICAL HYPOTHYROIDISM: ICD-10-CM

## 2025-08-14 DIAGNOSIS — E83.51 HYPOCALCEMIA: ICD-10-CM

## 2025-08-14 LAB
CA-I SERPL ISE-MCNC: 1.15 MMOL/L (ref 1.15–1.35)
PTH-INTACT SERPL-MCNC: 24.2 PG/ML (ref 15–65)
TSH SERPL DL<=0.05 MIU/L-ACNC: 1.81 UIU/ML (ref 0.27–4.2)

## 2025-08-14 PROCEDURE — 82330 ASSAY OF CALCIUM: CPT

## 2025-08-14 PROCEDURE — 83970 ASSAY OF PARATHORMONE: CPT

## 2025-08-14 PROCEDURE — 36415 COLL VENOUS BLD VENIPUNCTURE: CPT

## 2025-08-14 PROCEDURE — 84443 ASSAY THYROID STIM HORMONE: CPT

## 2025-08-19 ENCOUNTER — TELEPHONE (OUTPATIENT)
Dept: SURGERY | Facility: CLINIC | Age: 45
End: 2025-08-19
Payer: COMMERCIAL

## 2025-08-21 ENCOUNTER — TELEPHONE (OUTPATIENT)
Dept: SURGERY | Facility: CLINIC | Age: 45
End: 2025-08-21
Payer: COMMERCIAL

## (undated) DEVICE — SYR LL TP 10ML STRL

## (undated) DEVICE — PROBE 8225101 5PK STD PRASS FL TIP ROHS

## (undated) DEVICE — KT ORCA ORCAPOD DISP STRL

## (undated) DEVICE — IRRIGATOR BULB ASEPTO 60CC STRL

## (undated) DEVICE — TOWEL,OR,DSP,ST,BLUE,STD,4/PK,20PK/CS: Brand: MEDLINE

## (undated) DEVICE — 1LYRTR 16FR10ML100%SIL UMS SNP: Brand: MEDLINE INDUSTRIES, INC.

## (undated) DEVICE — DRAPE,REIN 53X77,STERILE: Brand: MEDLINE

## (undated) DEVICE — HARMONIC FOCUS SHEARS 9CM LENGTH + ADAPTIVE TISSUE TECHNOLOGY FOR USE WITH BLUE HAND PIECE ONLY: Brand: HARMONIC FOCUS

## (undated) DEVICE — DISPOSABLE BIPOLAR FORCEPS 4" (10.2CM) JEWELERS, STRAIGHT 0.4MM TIP AND 12 FT. (3.6M) CABLE: Brand: KIRWAN

## (undated) DEVICE — SPONGE,DISSECTOR,K,XRAY,9/16"X1/4",STRL: Brand: MEDLINE

## (undated) DEVICE — LN SMPL CO2 SHTRM SD STREAM W/M LUER

## (undated) DEVICE — DRAPE,INSTRUMENT,MAGNETIC,10X16: Brand: MEDLINE

## (undated) DEVICE — PENCL ES MEGADINE EZ/CLEAN BUTN W/HOLSTR 10FT

## (undated) DEVICE — PCH INST SURG INVISISHIELD 2PCKT

## (undated) DEVICE — FRCP BX RADJAW4 NDL 2.8 240CM LG OG BX40

## (undated) DEVICE — STPLR SKIN VISISTAT WD 35CT

## (undated) DEVICE — APPL CHLORAPREP HI/LITE 26ML ORNG

## (undated) DEVICE — BLCK/BITE BLOX W/DENTL/RIM W/STRAP 54F

## (undated) DEVICE — SUT VIC 3/0 CT2 27IN J232H

## (undated) DEVICE — SUT VIC PLS CTD ANTIB 2/0 18IN VCP111G

## (undated) DEVICE — ELECTRD BLD EZ CLN MOD XLNG 2.75IN

## (undated) DEVICE — CVR PROB GEN PURP W ISOSILK 6X48

## (undated) DEVICE — SUT ANTIBAC VICRYL/PLS ABS TIE COAT SZ3/0 12X18IN UD

## (undated) DEVICE — SENSR O2 OXIMAX FNGR A/ 18IN NONSTR

## (undated) DEVICE — PAD,NON-ADHERENT,3X8,STERILE,LF,1/PK: Brand: MEDLINE

## (undated) DEVICE — LABEL SHEET CUSTOM 2X2 YELLOW: Brand: MEDLINE INDUSTRIES, INC.

## (undated) DEVICE — DRSNG TELFA PAD NONADH STR 1S 3X4IN

## (undated) DEVICE — TUBING, SUCTION, 1/4" X 20', STRAIGHT: Brand: MEDLINE INDUSTRIES, INC.

## (undated) DEVICE — ADAPT CLN BIOGUARD AIR/H2O DISP

## (undated) DEVICE — TRAP FLD MINIVAC MEGADYNE 100ML

## (undated) DEVICE — CVR TRANSD CIV FLX TPR 11.9 TO 3.8X61CM

## (undated) DEVICE — YANKAUER,BULB TIP,W/O VENT,RIGID,STERILE: Brand: MEDLINE

## (undated) DEVICE — HYPODERMIC SAFETY NEEDLE: Brand: MONOJECT

## (undated) DEVICE — GLV SURG BIOGEL M LTX PF 6 1/2

## (undated) DEVICE — SUT VIC 5/0 PS2 18IN J495H

## (undated) DEVICE — PK ENT 40

## (undated) DEVICE — CANN O2 ETCO2 FITS ALL CONN CO2 SMPL A/ 7IN DISP LF

## (undated) DEVICE — TUBING, SUCTION, 1/4" X 10', STRAIGHT: Brand: MEDLINE